# Patient Record
Sex: MALE | Race: WHITE | NOT HISPANIC OR LATINO | Employment: FULL TIME | URBAN - METROPOLITAN AREA
[De-identification: names, ages, dates, MRNs, and addresses within clinical notes are randomized per-mention and may not be internally consistent; named-entity substitution may affect disease eponyms.]

---

## 2017-04-17 ENCOUNTER — APPOINTMENT (OUTPATIENT)
Dept: GENERAL RADIOLOGY | Facility: CLINIC | Age: 57
End: 2017-04-17
Attending: EMERGENCY MEDICINE
Payer: COMMERCIAL

## 2017-04-17 ENCOUNTER — HOSPITAL ENCOUNTER (EMERGENCY)
Facility: CLINIC | Age: 57
Discharge: HOME | End: 2017-04-17
Attending: EMERGENCY MEDICINE | Admitting: EMERGENCY MEDICINE
Payer: COMMERCIAL

## 2017-04-17 VITALS
OXYGEN SATURATION: 97 % | DIASTOLIC BLOOD PRESSURE: 97 MMHG | SYSTOLIC BLOOD PRESSURE: 118 MMHG | RESPIRATION RATE: 20 BRPM | TEMPERATURE: 97.4 F | BODY MASS INDEX: 36.92 KG/M2 | HEART RATE: 67 BPM | WEIGHT: 250 LBS

## 2017-04-17 DIAGNOSIS — R07.89 RIGHT-SIDED CHEST WALL PAIN: ICD-10-CM

## 2017-04-17 LAB
ALBUMIN SERPL-MCNC: 3.5 G/DL (ref 3.4–5)
ALP SERPL-CCNC: 112 U/L (ref 40–150)
ALT SERPL W P-5'-P-CCNC: 45 U/L (ref 0–70)
ANION GAP SERPL CALCULATED.3IONS-SCNC: 8 MMOL/L (ref 3–14)
AST SERPL W P-5'-P-CCNC: 30 U/L (ref 0–45)
BASOPHILS # BLD AUTO: 0 10E9/L (ref 0–0.2)
BASOPHILS NFR BLD AUTO: 0.5 %
BILIRUB SERPL-MCNC: 0.4 MG/DL (ref 0.2–1.3)
BUN SERPL-MCNC: 19 MG/DL (ref 7–30)
CALCIUM SERPL-MCNC: 8.5 MG/DL (ref 8.5–10.1)
CHLORIDE SERPL-SCNC: 105 MMOL/L (ref 94–109)
CO2 SERPL-SCNC: 27 MMOL/L (ref 20–32)
CREAT SERPL-MCNC: 0.93 MG/DL (ref 0.66–1.25)
D DIMER PPP FEU-MCNC: 0.3 UG/ML FEU (ref 0–0.5)
DIFFERENTIAL METHOD BLD: NORMAL
EOSINOPHIL # BLD AUTO: 0.3 10E9/L (ref 0–0.7)
EOSINOPHIL NFR BLD AUTO: 4.8 %
ERYTHROCYTE [DISTWIDTH] IN BLOOD BY AUTOMATED COUNT: 13.7 % (ref 10–15)
GFR SERPL CREATININE-BSD FRML MDRD: 84 ML/MIN/1.7M2
GLUCOSE SERPL-MCNC: 114 MG/DL (ref 70–99)
HCT VFR BLD AUTO: 44.7 % (ref 40–53)
HGB BLD-MCNC: 15.1 G/DL (ref 13.3–17.7)
IMM GRANULOCYTES # BLD: 0 10E9/L (ref 0–0.4)
IMM GRANULOCYTES NFR BLD: 0.3 %
LYMPHOCYTES # BLD AUTO: 1.9 10E9/L (ref 0.8–5.3)
LYMPHOCYTES NFR BLD AUTO: 29.1 %
MCH RBC QN AUTO: 28.5 PG (ref 26.5–33)
MCHC RBC AUTO-ENTMCNC: 33.8 G/DL (ref 31.5–36.5)
MCV RBC AUTO: 84 FL (ref 78–100)
MONOCYTES # BLD AUTO: 0.5 10E9/L (ref 0–1.3)
MONOCYTES NFR BLD AUTO: 8 %
NEUTROPHILS # BLD AUTO: 3.7 10E9/L (ref 1.6–8.3)
NEUTROPHILS NFR BLD AUTO: 57.3 %
PLATELET # BLD AUTO: 199 10E9/L (ref 150–450)
POTASSIUM SERPL-SCNC: 4 MMOL/L (ref 3.4–5.3)
PROT SERPL-MCNC: 7.4 G/DL (ref 6.8–8.8)
RBC # BLD AUTO: 5.3 10E12/L (ref 4.4–5.9)
SODIUM SERPL-SCNC: 140 MMOL/L (ref 133–144)
TROPONIN I SERPL-MCNC: NORMAL UG/L (ref 0–0.04)
WBC # BLD AUTO: 6.4 10E9/L (ref 4–11)

## 2017-04-17 PROCEDURE — 80053 COMPREHEN METABOLIC PANEL: CPT | Performed by: EMERGENCY MEDICINE

## 2017-04-17 PROCEDURE — 71020 XR CHEST 2 VW: CPT

## 2017-04-17 PROCEDURE — 85379 FIBRIN DEGRADATION QUANT: CPT | Performed by: EMERGENCY MEDICINE

## 2017-04-17 PROCEDURE — 99284 EMERGENCY DEPT VISIT MOD MDM: CPT | Mod: 25 | Performed by: EMERGENCY MEDICINE

## 2017-04-17 PROCEDURE — 99285 EMERGENCY DEPT VISIT HI MDM: CPT | Mod: 25

## 2017-04-17 PROCEDURE — 85025 COMPLETE CBC W/AUTO DIFF WBC: CPT | Performed by: EMERGENCY MEDICINE

## 2017-04-17 PROCEDURE — 84484 ASSAY OF TROPONIN QUANT: CPT | Performed by: EMERGENCY MEDICINE

## 2017-04-17 PROCEDURE — 93010 ELECTROCARDIOGRAM REPORT: CPT | Performed by: EMERGENCY MEDICINE

## 2017-04-17 PROCEDURE — 93005 ELECTROCARDIOGRAM TRACING: CPT

## 2017-04-17 ASSESSMENT — ENCOUNTER SYMPTOMS
CHEST TIGHTNESS: 1
CARDIOVASCULAR NEGATIVE: 1
ALLERGIC/IMMUNOLOGIC NEGATIVE: 1
HEMATOLOGIC/LYMPHATIC NEGATIVE: 1
MUSCULOSKELETAL NEGATIVE: 1
GASTROINTESTINAL NEGATIVE: 1
ENDOCRINE NEGATIVE: 1
PSYCHIATRIC NEGATIVE: 1
NEUROLOGICAL NEGATIVE: 1
CONSTITUTIONAL NEGATIVE: 1

## 2017-04-17 NOTE — ED AVS SNAPSHOT
[facility] Emergency Department    [address] [address]    Phone:  [phone]    Fax:  [phone]                                       Prosper Gilmore   MRN: [id number]    Department:  [facility] Emergency Department   Date of Visit:  4/17/2017           After Visit Summary Signature Page     I have received my discharge instructions, and my questions have been answered. I have discussed any challenges I see with this plan with the nurse or doctor.    ..........................................................................................................................................  Patient/Patient Representative Signature      ..........................................................................................................................................  Patient Representative Print Name and Relationship to Patient    ..................................................               ................................................  Date                                            Time    ..........................................................................................................................................  Reviewed by Signature/Title    ...................................................              ..............................................  Date                                                            Time

## 2017-04-17 NOTE — ED AVS SNAPSHOT
[facility] Emergency Department    [address] [address]    Phone:  [phone]    Fax:  [phone]                                       Prosper Gilmore   MRN: [id number]    Department:  [facility] Emergency Department   Date of Visit:  4/17/2017           Patient Information     Date Of Birth          1960        Your diagnoses for this visit were:     Right-sided chest wall pain        You were seen by Teodoro Salmeron MD.      Follow-up Information     Follow up with [facility] Emergency Department.    Specialty:  EMERGENCY MEDICINE    Why:  As needed, If symptoms worsen    Contact information:    [address] [facility] [address] [facility] [address] [phone]    Additional information:    The medical center is located at   [address]. (between [address] and   [address] in [place], [place] miles north   of [place]).        Follow up with No Ref-Primary, Physician.    Why:  Your primary care provider if progressive or worsening symptoms. You may benefit from additional testing        Discharge Instructions          *CHEST PAIN, UNCERTAIN CAUSE    Based on your exam today, the exact cause of your chest pain is not certain. Your condition does not seem serious at this time, and your pain does not appear to be coming from your heart. However, sometimes the signs of a serious problem take more time to appear. Therefore, watch for the warning signs listed below.  HOME CARE:  1. Rest today and avoid strenuous activity.  2. Take any prescribed medicine as directed.  FOLLOW UP with your doctor in 1-3 days.   GET PROMPT MEDICAL ATTENTION if any of the following occur:    A change in the type of pain: if it feels different, becomes more severe, lasts longer, or begins to spread into your shoulder, arm, neck, jaw or back    Shortness of breath or increased pain with breathing    Weakness, dizziness, or fainting    Cough with blood or dark colored sputum (phlegm)    Fever  over 101  F (38.3  C)    Swelling, pain or redness in one leg    0313-0936 [facility], [address] [place] [address], [place] [address]. All rights reserved. This information is not intended as a substitute for professional medical care. Always follow your healthcare professional's instructions.      24 Hour Appointment Hotline       To make an appointment at any [facility], call [phone] ([phone]). If you don't have a family doctor or clinic, we will help you find one. [facility] (formerly [facility]) are conveniently located to serve the needs of you and your family.             Review of your medicines      Our records show that you are taking the medicines listed below. If these are incorrect, please call your family doctor or clinic.        Dose / Directions Last dose taken    FLUoxetine 40 MG capsule   Commonly known as:  PROzac   Dose:  40 mg        Take 40 mg by mouth daily.   Refills:  0        losartan-hydrochlorothiazide 50-12.5 MG per tablet   Commonly known as:  HYZAAR   Dose:  1 tablet        Take 1 tablet by mouth daily.   Refills:  0        SIMVASTATIN PO   Dose:  20 mg        Take 20 mg by mouth daily.   Refills:  0        ZOLOFT PO   Dose:  50 mg        Take 50 mg by mouth daily.   Refills:  0                Procedures and tests performed during your visit     CBC with platelets differential    Chest XR,  PA & LAT    Comprehensive metabolic panel    D dimer quantitative    EKG 12 lead    Troponin I      Orders Needing Specimen Collection     None      Pending Results     No orders found from 4/15/2017 to 4/18/2017.            Pending Culture Results     No orders found from 4/15/2017 to 4/18/2017.            Test Results From Your Hospital Stay        4/17/2017  8:48 AM      Component Results     Component Value Ref Range & Units Status    WBC 6.4 4.0 - 11.0 10e9/L Final    RBC Count 5.30 4.4 - 5.9 10e12/L Final    Hemoglobin 15.1 13.3 - 17.7 g/dL Final    Hematocrit 44.7 40.0 - 53.0 % Final    MCV  84 78 - 100 fl Final    MCH 28.5 26.5 - 33.0 pg Final    MCHC 33.8 31.5 - 36.5 g/dL Final    RDW 13.7 10.0 - 15.0 % Final    Platelet Count 199 150 - 450 10e9/L Final    Diff Method Automated Method  Final    % Neutrophils 57.3 % Final    % Lymphocytes 29.1 % Final    % Monocytes 8.0 % Final    % Eosinophils 4.8 % Final    % Basophils 0.5 % Final    % Immature Granulocytes 0.3 % Final    Absolute Neutrophil 3.7 1.6 - 8.3 10e9/L Final    Absolute Lymphocytes 1.9 0.8 - 5.3 10e9/L Final    Absolute Monocytes 0.5 0.0 - 1.3 10e9/L Final    Absolute Eosinophils 0.3 0.0 - 0.7 10e9/L Final    Absolute Basophils 0.0 0.0 - 0.2 10e9/L Final    Abs Immature Granulocytes 0.0 0 - 0.4 10e9/L Final         4/17/2017  9:13 AM      Component Results     Component Value Ref Range & Units Status    Sodium 140 133 - 144 mmol/L Final    Potassium 4.0 3.4 - 5.3 mmol/L Final    Chloride 105 94 - 109 mmol/L Final    Carbon Dioxide 27 20 - 32 mmol/L Final    Anion Gap 8 3 - 14 mmol/L Final    Glucose 114 (H) 70 - 99 mg/dL Final    Urea Nitrogen 19 7 - 30 mg/dL Final    Creatinine 0.93 0.66 - 1.25 mg/dL Final    GFR Estimate 84 >60 mL/min/1.7m2 Final    Non  GFR Calc    GFR Estimate If Black >90   GFR Calc   >60 mL/min/1.7m2 Final    Calcium 8.5 8.5 - 10.1 mg/dL Final    Bilirubin Total 0.4 0.2 - 1.3 mg/dL Final    Albumin 3.5 3.4 - 5.0 g/dL Final    Protein Total 7.4 6.8 - 8.8 g/dL Final    Alkaline Phosphatase 112 40 - 150 U/L Final    ALT 45 0 - 70 U/L Final    AST 30 0 - 45 U/L Final         4/17/2017  9:13 AM      Component Results     Component Value Ref Range & Units Status    Troponin I ES  0.000 - 0.045 ug/L Final    <0.015  The 99th percentile for upper reference range is 0.045 ug/L.  Troponin values in   the range of 0.045 - 0.120 ug/L may be associated with risks of adverse   clinical events.           4/17/2017  9:01 AM      Component Results     Component Value Ref Range & Units Status    D Dimer  "0.3 0.0 - 0.50 ug/ml FEU Final    This D-dimer assay is intended for use in conjuntion with a clinical pretest   probability assessment model to exclude pulmonary embolism (PE) and as an aid   in the diagnosis of deep venous thrombosis (DVT) in outpatients suspected of   PE   or DVT. The cut-off value is 0.5 g/mL FEU.           2017  9:56 AM      Narrative     XR CHEST 2 VW 2017 9:53 AM    HISTORY: Right anterior chest pain.    COMPARISON: None.    FINDINGS: No airspace consolidation, pleural effusion or pneumothorax.  Normal heart size. Left shoulder arthroplasty noted. Moderate  degenerative change at the right glenohumeral joint.        Impression     IMPRESSION: No acute cardiopulmonary abnormality.    ANDERS PALM MD                Thank you for choosing [facility]       Thank you for choosing [facility] for your care. Our goal is always to provide you with excellent care. Hearing back from our patients is one way we can continue to improve our services. Please take a few minutes to complete the written survey that you may receive in the mail after you visit with us. Thank you!        Enbase Information     Enbase lets you send messages to your doctor, view your test results, renew your prescriptions, schedule appointments and more. To sign up, go to [email or web address] . Click on \"Log in\" on the left side of the screen, which will take you to the Welcome page. Then click on \"Sign up Now\" on the right side of the page.     You will be asked to enter the access code listed below, as well as some personal information. Please follow the directions to create your username and password.     Your access code is: [email or web address]  Expires: 2017 10:42 AM     Your access code will  in 90 days. If you need help or a new code, please call your [facility] clinic or [phone].        Care EveryWhere ID     This is your Care EveryWhere ID. This could be used by other organizations to access your [facility] " medical records  [id number]        After Visit Summary       This is your record. Keep this with you and show to your community pharmacist(s) and doctor(s) at your next visit.

## 2017-04-17 NOTE — ED PROVIDER NOTES
History     Chief Complaint   Patient presents with     Chest Pain     HPI  Prosper Gilmore is a 56 year old male with a history of hypertension and hyperlipidemia who presents for evaluation for right anterior chest wall pain and discomfort.  Patient reports a history of pulmonary embolism after left hip surgery in 2010.  He was concerned because the pain was reminiscent of his history of PE 7 years ago and decided to come into the ED to be evaluated and assessed.  He lives in [facility] [place] but works in [place].  He is a nonsmoker.  He reports no cough, no chest tightness or pressure.  He does report some pleuritic nature to his discomfort with deep breaths.  No history of pneumothorax.  He is currently on Hyzaar, simvastatin Prozac and Zoloft.    Social history:Lives in [place], [place]. Here in ED alone by private car. Works in [place], [place]. Non-smoker.    Past medical history: History of pulmonary embolism after hip surgery in 2010.  History of hypertension, history of hyperlipidemia.    Medications:  No current facility-administered medications for this encounter.      Current Outpatient Prescriptions   Medication     meclizine (ANTIVERT) 25 MG tablet     FLUoxetine (PROZAC) 40 MG capsule     Sertraline HCl (ZOLOFT PO)     SIMVASTATIN PO     losartan-hydrochlorothiazide (HYZAAR) 50-12.5 MG per tablet     Allergies:   No Known Allergies  I have reviewed the Medications, Allergies, Past Medical and Surgical History, and Social History in the Epic system.    Review of Systems   Constitutional: Negative.    HENT: Negative.    Respiratory: Positive for chest tightness (right anterior chest discomfort.  ).    Cardiovascular: Negative.    Gastrointestinal: Negative.    Endocrine: Negative.    Genitourinary: Negative.    Musculoskeletal: Negative.    Skin: Negative.    Allergic/Immunologic: Negative.    Neurological: Negative.    Hematological: Negative.    Psychiatric/Behavioral: Negative.         Physical Exam   BP: (!) 151/97  Pulse: 67  Temp: 97.4  F (36.3  C)  Resp: 16  Weight: 113.4 kg (250 lb)  SpO2: 98 %  Physical Exam   Constitutional: He is oriented to person, place, and time. He appears well-developed and well-nourished. No distress.   HENT:   Head: Normocephalic and atraumatic.   Eyes: Conjunctivae and EOM are normal. Pupils are equal, round, and reactive to light. Right eye exhibits no discharge. Left eye exhibits no discharge. No scleral icterus.   Neck: Normal range of motion. Neck supple. No JVD present. No tracheal deviation present. No thyromegaly present.   Cardiovascular: Normal rate and regular rhythm.  Exam reveals no gallop and no friction rub.    No murmur heard.  Pulmonary/Chest: Effort normal and breath sounds normal. No stridor. No respiratory distress. He has no wheezes. He has no rales. He exhibits no tenderness.   Abdominal: Soft. Bowel sounds are normal. He exhibits no distension and no mass. There is no tenderness. There is no rebound and no guarding.   Lymphadenopathy:     He has no cervical adenopathy.   Neurological: He is alert and oriented to person, place, and time.   Skin: No rash noted. He is not diaphoretic. No erythema. No pallor.   Psychiatric: He has a normal mood and affect. His behavior is normal. Judgment and thought content normal.       ED Course     ED Course     Procedures             EKG Interpretation:      Interpreted by Teodoro Salmeron  Time reviewed:8:21AM  Symptoms at time of EKG: None   Rhythm: normal sinus   Rate: Normal  Axis: Normal  Ectopy: none  Conduction: normal  ST Segments/ T Waves: Non-specific ST-T wave changes, T wave inversion in III  Q Waves: nonspecific  Comparison to prior: Unchanged from 12/13/12    Clinical Impression: no acute changes          Critical Care time:  none                 ED medications: none    ED labs and imaging:  Results for orders placed or performed during the hospital encounter of 04/17/17 (from the  past 24 hour(s))   CBC with platelets differential   Result Value Ref Range    WBC 6.4 4.0 - 11.0 10e9/L    RBC Count 5.30 4.4 - 5.9 10e12/L    Hemoglobin 15.1 13.3 - 17.7 g/dL    Hematocrit 44.7 40.0 - 53.0 %    MCV 84 78 - 100 fl    MCH 28.5 26.5 - 33.0 pg    MCHC 33.8 31.5 - 36.5 g/dL    RDW 13.7 10.0 - 15.0 %    Platelet Count 199 150 - 450 10e9/L    Diff Method Automated Method     % Neutrophils 57.3 %    % Lymphocytes 29.1 %    % Monocytes 8.0 %    % Eosinophils 4.8 %    % Basophils 0.5 %    % Immature Granulocytes 0.3 %    Absolute Neutrophil 3.7 1.6 - 8.3 10e9/L    Absolute Lymphocytes 1.9 0.8 - 5.3 10e9/L    Absolute Monocytes 0.5 0.0 - 1.3 10e9/L    Absolute Eosinophils 0.3 0.0 - 0.7 10e9/L    Absolute Basophils 0.0 0.0 - 0.2 10e9/L    Abs Immature Granulocytes 0.0 0 - 0.4 10e9/L   Comprehensive metabolic panel   Result Value Ref Range    Sodium 140 133 - 144 mmol/L    Potassium 4.0 3.4 - 5.3 mmol/L    Chloride 105 94 - 109 mmol/L    Carbon Dioxide 27 20 - 32 mmol/L    Anion Gap 8 3 - 14 mmol/L    Glucose 114 (H) 70 - 99 mg/dL    Urea Nitrogen 19 7 - 30 mg/dL    Creatinine 0.93 0.66 - 1.25 mg/dL    GFR Estimate 84 >60 mL/min/1.7m2    GFR Estimate If Black >90   GFR Calc   >60 mL/min/1.7m2    Calcium 8.5 8.5 - 10.1 mg/dL    Bilirubin Total 0.4 0.2 - 1.3 mg/dL    Albumin 3.5 3.4 - 5.0 g/dL    Protein Total 7.4 6.8 - 8.8 g/dL    Alkaline Phosphatase 112 40 - 150 U/L    ALT 45 0 - 70 U/L    AST 30 0 - 45 U/L   Troponin I   Result Value Ref Range    Troponin I ES  0.000 - 0.045 ug/L     <0.015  The 99th percentile for upper reference range is 0.045 ug/L.  Troponin values in   the range of 0.045 - 0.120 ug/L may be associated with risks of adverse   clinical events.     D dimer quantitative   Result Value Ref Range    D Dimer 0.3 0.0 - 0.50 ug/ml FEU   Chest XR,  PA & LAT    Narrative    XR CHEST 2 VW 4/17/2017 9:53 AM    HISTORY: Right anterior chest pain.    COMPARISON: None.    FINDINGS: No  airspace consolidation, pleural effusion or pneumothorax.  Normal heart size. Left shoulder arthroplasty noted. Moderate  degenerative change at the right glenohumeral joint.      Impression    IMPRESSION: No acute cardiopulmonary abnormality.    ANDERS PALM MD       ED vitals:  Vitals:    04/17/17 0805 04/17/17 0808 04/17/17 0830   BP:  (!) 151/97    Pulse: 67     Resp: 16  20   Temp: 97.4  F (36.3  C)     TempSrc: Oral     SpO2: 98% 97%    Weight:  113.4 kg (250 lb)      Assessments & Plan (with Medical Decision Making)   Clinical impression: Pleasant 56-year-old male who presented for right anterior chest pain and discomfort that is positional after he awoke from sleep this morning.  He was concerned that his chest discomfort is reminiscent of his history of pulmonary embolism which she had 7 years ago after left hip surgery.  Pain was pleuritic in nature at onset but he is currently asymptomatic.  No fever, no cough, no shortness of breath no rash around the chest.  No back pain or flank pain.  He reports no trauma to the chest wall and no excessive exercise or anything out of the ordinary from his routine.  On my exam he is in no acute distress he has no complaints is a normal cardiac exam and normal lung exam with normal chest excursion.      ED course and Plan:  EKG on ED arrival shows T-wave inversion in lead 3.  Normal sinus rhythm no acute ischemia is appreciated.  An EKG is unchanged from comparison dated 12/13/2013.  He has normal labs today including a negative d-dimer normal troponin.  A chest x-ray was obtained to exclude any acute cardiopulmonary process.  X-ray of the chest was negative for acute cardiopulmonary process.  See radiologist interpretation in detailed report above.  He remained asymptomatic during my course of care and evaluation emergency Department.    He is  is discharged home with chest pain of unclear cause.  Pain is likely musculoskeletal in nature given positional nature,  unremarkable exam, normal d-dimer and troponin.  We did discuss that if he has progressive or worsening symptoms he will benefit from follow-up with his primary care provider in 3 days for additional testing and imaging or provocation studies including stress testing.      Disclaimer: This note consists of symbols derived from keyboarding, dictation and/or voice recognition software. As a result, there may be errors in the script that have gone undetected. Please consider this when interpreting information found in this chart.  I have reviewed the nursing notes.    I have reviewed the findings, diagnosis, plan and need for follow up with the patient.    New Prescriptions    No medications on file       Final diagnoses:   Right-sided chest wall pain       4/17/2017   [facility] EMERGENCY DEPARTMENT     Teodoro Salmeron MD  04/17/17 8611

## 2017-04-17 NOTE — ED NOTES
Pt here with right sided chest pain, worse with activity. Pt reports HX of PE after hip surgery in the past

## 2024-01-14 ENCOUNTER — TRANSFERRED RECORDS (OUTPATIENT)
Dept: HEALTH INFORMATION MANAGEMENT | Facility: CLINIC | Age: 64
End: 2024-01-14

## 2024-02-15 ENCOUNTER — TRANSFERRED RECORDS (OUTPATIENT)
Dept: HEALTH INFORMATION MANAGEMENT | Facility: CLINIC | Age: 64
End: 2024-02-15

## 2024-03-15 ENCOUNTER — TRANSFERRED RECORDS (OUTPATIENT)
Dept: HEALTH INFORMATION MANAGEMENT | Facility: CLINIC | Age: 64
End: 2024-03-15

## 2024-03-19 ENCOUNTER — TRANSFERRED RECORDS (OUTPATIENT)
Dept: HEALTH INFORMATION MANAGEMENT | Facility: CLINIC | Age: 64
End: 2024-03-19

## 2024-03-27 ENCOUNTER — TELEPHONE (OUTPATIENT)
Dept: ORTHOPEDICS | Facility: CLINIC | Age: 64
End: 2024-03-27

## 2024-03-27 NOTE — TELEPHONE ENCOUNTER
Sent [employer] (1st Attempt) for the patient to call back and schedule the following:    Appointment type: New Shoulder  Provider: Dr. Sewell  Return date: 4/15/2024  Specialty phone number: [phone]  Additional appointment(s) needed:   Additonal Notes:     Attempted to reach the patient and schedule an appointment with Dr. Sewell on 4/15/2024, in a 20 min spot per Ortho Surg Staff.    Patient phone not accepting calls, no voicemail. Sent a [employer] message.    Ignacia GARCIA/Complex Procedure    [facility]   Neurology, NeuroSurgery, NeuroPsychology, Pain Management and Cardiology Specialties  Medical/Surgical Adult Specialties

## 2024-04-15 ENCOUNTER — OFFICE VISIT (OUTPATIENT)
Dept: ORTHOPEDICS | Facility: CLINIC | Age: 64
End: 2024-04-15
Payer: COMMERCIAL

## 2024-04-15 ENCOUNTER — TELEPHONE (OUTPATIENT)
Dept: ORTHOPEDICS | Facility: CLINIC | Age: 64
End: 2024-04-15

## 2024-04-15 VITALS — WEIGHT: 262 LBS | BODY MASS INDEX: 37.51 KG/M2 | HEIGHT: 70 IN

## 2024-04-15 DIAGNOSIS — Z96.612 INFECTION ASSOCIATED WITH PROSTHESIS OF LEFT SHOULDER JOINT (H): Primary | ICD-10-CM

## 2024-04-15 DIAGNOSIS — T84.59XA INFECTION ASSOCIATED WITH PROSTHESIS OF LEFT SHOULDER JOINT (H): Primary | ICD-10-CM

## 2024-04-15 PROCEDURE — 99204 OFFICE O/P NEW MOD 45 MIN: CPT | Performed by: ORTHOPAEDIC SURGERY

## 2024-04-15 NOTE — LETTER
4/15/2024         RE: Prosper Gilmore  [address] [place] [address]        Dear Colleague,    Thank you for referring your patient, Prosper Gilmore, to the [facility]. Please see a copy of my visit note below.    CHIEF CONCERN:  Left shoulder resection of anatomic shoulder arthroplasty     HISTORY OF PRESENT ILLNESS:  Mr. Gilmore is a 63 year old RHD man who was referred by Dr. HERMAN Dickinson for the patient's complex shoulder condition. He had an anatomic TSA done approximately 22 years ago and it performed well for quite some time. He describes that the original TSA was done for end stage arthrosis with possible collapse. 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 He worked as a referee within pro or semi-pro leagues. He ultimately developed loosening of his all poly cemented glenoid component and underwent resection of the implants with Dr. Dickinson and placement of a spacer on 3/6/24. Cultures grew C acnes and he has received antibiotics (IV Ceftriaxone) with a stop date planned for 5/2/24 (his ID physician is Dr. Michael in [facility]).    Past Medical History:   1. Deep vein blood clot of left lower extremity (HC) after left SANJU and immoblization   2. GERD (gastroesophageal reflux disease)   3. Hydrocele 2023   4. Hyperlipidemia   5. Hypertension   6. Obesity   7. Pulmonary embolism (HC) -left SANJU and immoblization provoked clot   8. Sleep apnea     Current Outpatient Medications   Medication Sig Dispense Refill     FLUoxetine (PROZAC) 40 MG capsule Take 40 mg by mouth daily.       losartan-hydrochlorothiazide (HYZAAR) 50-12.5 MG per tablet Take 1 tablet by mouth daily.       Sertraline HCl (ZOLOFT PO) Take 50 mg by mouth daily.       SIMVASTATIN PO Take 20 mg by mouth daily.          No Known Allergies    SOCIAL HISTORY:    Social History     Socioeconomic History     Marital status:      Spouse name: Not on file     Number of children: Not on  file     Years of education: Not on file     Highest education level: Not on file   Occupational History     Not on file   Tobacco Use     Smoking status: Never     Smokeless tobacco: Never   Substance and Sexual Activity     Alcohol use: No     Drug use: No     Sexual activity: Not on file   Other Topics Concern     Not on file   Social History Narrative     Not on file     Social Determinants of Health     Financial Resource Strain: Not At Risk (9/3/2023)    Received from [employer]    Financial Resource Strain      Is it hard for you to pay for the very basics like food, housing, medical care or heating?: No   Food Insecurity: Not At Risk (9/3/2023)    [facility]    Food Insecurity      Does your food run out before you have the money to buy more?: No   Transportation Needs: Not At Risk (9/3/2023)    Received from [employer]    Transportation Needs      Does a lack of transportation keep you from your medical appointments or from getting your medications?: No   Physical Activity: Not on file   Stress: Not on file   Social Connections: Unknown (3/15/2024)    [facility] [facility] [facility]    Social Connections      Frequency of Communication with Friends and Family: Not on file   Interpersonal Safety: Not on file   Housing Stability: Not on file       FAMILY HISTORY: Reviewed in EMR      REVIEW OF SYSTEMS: Positive for that noted in past medical history and history of present illness and otherwise reviewed in EMR     PHYSICAL EXAM:    Adult male in no acute distress. Articulates and communicates with normal affect.  Respirations even and unlabored  Focused upper extremity exam: Skin intact. No erythema. Sensation intact all dermatomes into the hand to light touch. EPL, FPL, and Intrinsics intact. Right shoulder active motion is FE to 145, ER at side to 35, and IR to  back pocket. Left shoulder motion limited by resection and spacer/recent surgery. Incisions healing well.      IMAGING:  Left shoulder CT done after his resection arthroplasty reviewed and demonstrates a large area of glenoid bone loss. 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 We discussed the steps toward revision arthroplasty and that this would (if achieving any sort of total shoulder arthroplasty) require a custom glenoid implant. In my hands this would be a VRS system patient specific implant. His postop CT has been received by the  and we are awaiting a plan from the engineers. I will review and ideally approve such a plan. We discussed that cultures would be obtained at that time. We have worked with our own ID team to determine a path forward and would not repeat cultures in another 2 stage approach for a number of reasons but primarily owing to organism specifics and patient quality of life. We discussed that if cultures remain positive at the revision he could require another course of antibiotics and a chronic infection is a possibility.   I told the patient there are no guarantees with surgery, that a person could be no better or even worse if they became stiff, further infected, need further surgery, have injury to the nerves and arteries that power the hand and arm, have a reaction to the anesthetic.  We will await a timeline from the implant engineers and inform patient of a date pending that information (he is aware implant manufacturing can take 8-10 weeks).     Kesha Sewell MD    Again, thank you for allowing me to participate in the care of your patient.        Sincerely,        Kesha Sewell MD

## 2024-04-15 NOTE — TELEPHONE ENCOUNTER
Waiting on [employer] to review VRS and then can give Pt a surgery date.    Procedure: Left reverse total shoulder arthroplasty  Facility: [facility]  Length: ? minutes  Anesthesia: Choice, Interscalene Block  Post-op appointments needed: 2 weeks with surgeon or PA, 6 weeks with surgeon only.  Surgery packet/instructions given to patient?  Yes     Martínez Emmanuel RN

## 2024-04-15 NOTE — NURSING NOTE
"Reason For Visit:   Chief Complaint   Patient presents with    Consult For     Left shoulder pain - temporary replacement placed and he would like a permanent one.        PCP: No Ref-Primary, Physician  Ref: Dr. Dickinson    ?  No  Occupation .  Currently working? No.  Work status?  Full time.  Date of injury: Worse over time  Type of injury: Worse over time.  Date of surgery: s/p left total shoulder arthroplasty explantation, placement of antibiotic spacer, and irrigation and excisional debridement DOS: 3/6/24; s/p left TSA DOS: 22 years ago  Smoker: No  Request smoking cessation information: No    Right hand dominant    SANE score  Affected shoulder: Left  Right shoulder SANE: 50   Left shoulder SANE: 0    Ht 1.778 m (5' 10\")   Wt 118.8 kg (262 lb)   BMI 37.59 kg/m      Candi Hodgson ATC  "

## 2024-04-15 NOTE — NURSING NOTE
"Pre-Operative Teaching Flowsheet     Person(s) involved in teaching: Patient     Motivation Level:  Receptive (willing/able to accept information) and asks appropriate questions where applicable: Yes  Any cultural factors/Evangelical beliefs that may influence understanding or compliance? No     Patient demonstrates understanding of the following:  Pre-operative planning, including the necessary appointments and preparation needed prior to surgery: Yes  Which situations necessitate calling provider and whom to contact: Yes  Pain management techniques pre and post op: Yes  Stoplight tool introduced, questions answered, patient expressed understanding: Yes  How, and when, to access community resources: Yes  Discussed appropriate and safe discharge to home: Yes  Patient has a designated  for surgery and \"\" to stay with them after: Yes    Additional Teaching Concerns Addressed:  Post-operative living arrangements and necessary adaptations to living environment.  Instructional Materials Used/Given: Yes, pre-op packet given to patient with additional system forms added as needed depending on type of surgery. Pre-op soap given (if in clinic).     Time spent with patient: 20 minutes.    Martínez Emmanuel RNCC    "

## 2024-04-23 NOTE — TELEPHONE ENCOUNTER
Dr. Sewell discussed with [employer] and they advised that implant would be available mid-June. Will see if we can get OR time around then.    Martínez Emmanuel RNCC

## 2024-04-27 ENCOUNTER — HEALTH MAINTENANCE LETTER (OUTPATIENT)
Age: 64
End: 2024-04-27

## 2024-04-28 NOTE — PROGRESS NOTES
CHIEF CONCERN:  Left shoulder resection of anatomic shoulder arthroplasty     HISTORY OF PRESENT ILLNESS:  Mr. Gilmore is a 63 year old RHD man who was referred by Dr. HERMAN Dickinson for the patient's complex shoulder condition. He had an anatomic TSA done approximately 22 years ago and it performed well for quite some time. He describes that the original TSA was done for end stage arthrosis with possible collapse. 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 He worked as a referee within pro or semi-pro leagues. He ultimately developed loosening of his all poly cemented glenoid component and underwent resection of the implants with Dr. Dickinson and placement of a spacer on 3/6/24. Cultures grew C acnes and he has received antibiotics (IV Ceftriaxone) with a stop date planned for 5/2/24 (his ID physician is Dr. Michael in [facility]).    Past Medical History:   1. Deep vein blood clot of left lower extremity (HC) after left SANJU and immoblization   2. GERD (gastroesophageal reflux disease)   3. Hydrocele 2023   4. Hyperlipidemia   5. Hypertension   6. Obesity   7. Pulmonary embolism (HC) -left SANJU and immoblization provoked clot   8. Sleep apnea     Current Outpatient Medications   Medication Sig Dispense Refill    FLUoxetine (PROZAC) 40 MG capsule Take 40 mg by mouth daily.      losartan-hydrochlorothiazide (HYZAAR) 50-12.5 MG per tablet Take 1 tablet by mouth daily.      Sertraline HCl (ZOLOFT PO) Take 50 mg by mouth daily.      SIMVASTATIN PO Take 20 mg by mouth daily.          No Known Allergies    SOCIAL HISTORY:    Social History     Socioeconomic History    Marital status:      Spouse name: Not on file    Number of children: Not on file    Years of education: Not on file    Highest education level: Not on file   Occupational History    Not on file   Tobacco Use    Smoking status: Never    Smokeless tobacco: Never   Substance and Sexual Activity    Alcohol use: No    Drug use: No     Sexual activity: Not on file   Other Topics Concern    Not on file   Social History Narrative    Not on file     Social Determinants of Health     Financial Resource Strain: Not At Risk (9/3/2023)    Received from [employer] [facility]    Financial Resource Strain     Is it hard for you to pay for the very basics like food, housing, medical care or heating?: No   Food Insecurity: Not At Risk (9/3/2023)    Received from [facility] [facility]    Food Insecurity     Does your food run out before you have the money to buy more?: No   Transportation Needs: Not At Risk (9/3/2023)    Received from [facility] [facility]    Transportation Needs     Does a lack of transportation keep you from your medical appointments or from getting your medications?: No   Physical Activity: Not on file   Stress: Not on file   Social Connections: Unknown (3/15/2024)    Received from [employer] [facility], [facility]    Social Connections     Frequency of Communication with Friends and Family: Not on file   Interpersonal Safety: Not on file   Housing Stability: Not on file       FAMILY HISTORY: Reviewed in EMR      REVIEW OF SYSTEMS: Positive for that noted in past medical history and history of present illness and otherwise reviewed in EMR     PHYSICAL EXAM:    Adult male in no acute distress. Articulates and communicates with normal affect.  Respirations even and unlabored  Focused upper extremity exam: Skin intact. No erythema. Sensation intact all dermatomes into the hand to light touch. EPL, FPL, and Intrinsics intact. Right shoulder active motion is FE to 145, ER at side to 35, and IR to back pocket. Left shoulder motion limited by resection and spacer/recent surgery. Incisions healing well.      IMAGING:  Left shoulder CT done after his resection arthroplasty reviewed and demonstrates a large area of glenoid bone loss. 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 We discussed the steps toward revision arthroplasty and that this would (if achieving any sort of total shoulder arthroplasty) require a custom glenoid implant. In my hands this would be a VRS system patient specific implant. His postop CT has been received by the  and we are awaiting a plan from the engineers. I will review and ideally approve such a plan. We discussed that cultures would be obtained at that time. We have worked with our own ID team to determine a path forward and would not repeat cultures in another 2 stage approach for a number of reasons but primarily owing to organism specifics and patient quality of life. We discussed that if cultures remain positive at the revision he could require another course of antibiotics and a chronic infection is a possibility.   I told the patient there are no guarantees with surgery, that a person could be no better or even worse if they became stiff, further infected, need further surgery, have injury to the nerves and arteries that power the hand and arm, have a reaction to the anesthetic.  We will await a timeline from the implant engineers and inform patient of a date pending that information (he is aware implant manufacturing can take 8-10 weeks).     Kesha Sewell MD

## 2024-04-29 DIAGNOSIS — Z96.612 INFECTION ASSOCIATED WITH PROSTHESIS OF LEFT SHOULDER JOINT (H): Primary | ICD-10-CM

## 2024-04-29 DIAGNOSIS — T84.59XA INFECTION ASSOCIATED WITH PROSTHESIS OF LEFT SHOULDER JOINT (H): Primary | ICD-10-CM

## 2024-05-01 NOTE — TELEPHONE ENCOUNTER
Date Scheduled: 7-17-24  Facility: Surgery Locations: [facility]  Surgeon: Dr. Sewell   Post-op appointment scheduled:    scheduled?: No  Surgery packet/instructions confirmed received?  Yes  Pre op physical/PAC appointment: [facility]  Special Considerations:       Would like to get moved up if possible.      Aby Minor  Surgery Scheduling Coordinator  Ph: [phone]

## 2024-06-27 NOTE — TELEPHONE ENCOUNTER
Received voicemail from patient that he would like a call back confirming that the implant is available for his surgery on 7-17. He had his pre op physical on 6-20-24, so he just wants to confirm that there are no glitches with his surgery. Routing to Dr. Sewell's team to see if they have any updates.    Aby Minor  Surgery Scheduling Coordinator  Ph: [phone]

## 2024-07-10 RX ORDER — OMEPRAZOLE 40 MG/1
40 CAPSULE, DELAYED RELEASE ORAL DAILY
COMMUNITY

## 2024-07-10 RX ORDER — BUPROPION HYDROCHLORIDE 300 MG/1
300 TABLET ORAL EVERY MORNING
COMMUNITY

## 2024-07-10 RX ORDER — LORATADINE 10 MG/1
10 TABLET ORAL DAILY
Status: ON HOLD | COMMUNITY
End: 2024-07-17

## 2024-07-10 RX ORDER — MULTIPLE VITAMINS W/ MINERALS TAB 9MG-400MCG
1 TAB ORAL DAILY
COMMUNITY

## 2024-07-10 RX ORDER — ASPIRIN 81 MG/1
81 TABLET ORAL DAILY
COMMUNITY

## 2024-07-10 RX ORDER — NALTREXONE HYDROCHLORIDE 50 MG/1
50 TABLET, FILM COATED ORAL DAILY
COMMUNITY

## 2024-07-10 RX ORDER — AZELASTINE 1 MG/ML
1 SPRAY, METERED NASAL DAILY PRN
COMMUNITY

## 2024-07-10 RX ORDER — TAMSULOSIN HYDROCHLORIDE 0.4 MG/1
0.4 CAPSULE ORAL DAILY
COMMUNITY

## 2024-07-10 NOTE — PHARMACY-ADMISSION MEDICATION HISTORY
Pharmacy Intern Pre-operative Admission Medication History    Admission medication history was completed on July 10, 2024 in anticipation for upcoming surgical admission currently scheduled for 7/17/24. The information provided in this note is only as accurate as the sources available at the time of the update.  Pre-operative nursing staff should still review this list with patient for any changes or updates.     Information Source(s): Patient via phone    Pertinent Information:   Patient stated that his spouse helps maintain his medications. This med history was completed without her present and patient was unsure if there were any more medications to add. It is notable that Care Everywhere has active orders for acetaminophen 1000 mg q8h PRN and hydroxyzine 25 mg q8h PRN.    Patient stopped taking CoQ-10 on 7/10/24 with no plans to restart.    Changes made to PTA medication list:  Added:   Azelastine spray  Bupropion 300 mg  Naltrexone 50 mg  Tamsulosin 0.4 mg cap  Vitamin D3 PO  Aspirin 81 mg  Multivitamin  Fish oil PO  Loratadine 10 mg  Omeprazole 40 mg  Deleted:   Fluoxetine 40 mg cap  Changed:   Sertraline 50 mg -> 200 mg    Allergies reviewed with patient and updates made in EHR: yes    Medication History Completed By: Billy Palomo 7/10/2024 2:57 PM    PTA Med List   Medication Sig Last Dose    aspirin 81 MG EC tablet Take 81 mg by mouth daily     azelastine (ASTELIN) 0.1 % nasal spray Spray 1 spray into both nostrils daily as needed for rhinitis     buPROPion (WELLBUTRIN XL) 300 MG 24 hr tablet Take 300 mg by mouth every morning     Cholecalciferol (VITAMIN D3 PO) Take by mouth daily     loratadine (CLARITIN) 10 MG tablet Take 10 mg by mouth daily     losartan-hydrochlorothiazide (HYZAAR) 50-12.5 MG per tablet Take 1 tablet by mouth daily.     multivitamin w/minerals (THERA-VIT-M) tablet Take 1 tablet by mouth daily     naltrexone (DEPADE/REVIA) 50 MG tablet Take 50 mg by mouth daily     Omega-3 Fatty  Acids (FISH OIL PO)      omeprazole (PRILOSEC) 40 MG DR capsule Take 40 mg by mouth daily     sertraline (ZOLOFT) 100 MG tablet Take 200 mg by mouth daily     simvastatin (ZOCOR) 20 MG tablet Take 20 mg by mouth daily     tamsulosin (FLOMAX) 0.4 MG capsule Take 0.4 mg by mouth daily

## 2024-07-15 RX ORDER — CETIRIZINE HYDROCHLORIDE 10 MG/1
10 TABLET ORAL DAILY
COMMUNITY

## 2024-07-16 ENCOUNTER — ANESTHESIA EVENT (OUTPATIENT)
Dept: SURGERY | Facility: CLINIC | Age: 64
DRG: 483 | End: 2024-07-16
Payer: COMMERCIAL

## 2024-07-17 ENCOUNTER — APPOINTMENT (OUTPATIENT)
Dept: GENERAL RADIOLOGY | Facility: CLINIC | Age: 64
DRG: 483 | End: 2024-07-17
Attending: ORTHOPAEDIC SURGERY
Payer: COMMERCIAL

## 2024-07-17 ENCOUNTER — HOSPITAL ENCOUNTER (INPATIENT)
Facility: CLINIC | Age: 64
LOS: 1 days | Discharge: HOME | DRG: 483 | End: 2024-07-18
Attending: ORTHOPAEDIC SURGERY | Admitting: ORTHOPAEDIC SURGERY
Payer: COMMERCIAL

## 2024-07-17 ENCOUNTER — ANESTHESIA (OUTPATIENT)
Dept: SURGERY | Facility: CLINIC | Age: 64
DRG: 483 | End: 2024-07-17
Payer: COMMERCIAL

## 2024-07-17 DIAGNOSIS — Z96.612 HISTORY OF LEFT SHOULDER REPLACEMENT: Primary | ICD-10-CM

## 2024-07-17 PROBLEM — K42.9 UMBILICAL HERNIA WITHOUT OBSTRUCTION AND WITHOUT GANGRENE: Status: ACTIVE | Noted: 2024-07-17

## 2024-07-17 PROBLEM — R73.03 PREDIABETES: Status: ACTIVE | Noted: 2023-09-07

## 2024-07-17 PROBLEM — M48.07 SPINAL STENOSIS, LUMBOSACRAL REGION: Status: ACTIVE | Noted: 2024-07-17

## 2024-07-17 PROBLEM — G47.33 OBSTRUCTIVE SLEEP APNEA (ADULT) (PEDIATRIC): Status: ACTIVE | Noted: 2024-07-17

## 2024-07-17 PROBLEM — G47.00 INSOMNIA, UNSPECIFIED TYPE: Status: ACTIVE | Noted: 2022-11-08

## 2024-07-17 PROBLEM — N40.0 BPH (BENIGN PROSTATIC HYPERPLASIA): Status: ACTIVE | Noted: 2024-03-05

## 2024-07-17 PROBLEM — T84.50XA INFECTION AND INFLAMMATORY REACTION DUE TO INTERNAL JOINT PROSTHESIS (H): Status: ACTIVE | Noted: 2024-03-15

## 2024-07-17 PROBLEM — K40.90 NON-RECURRENT UNILATERAL INGUINAL HERNIA WITHOUT OBSTRUCTION OR GANGRENE: Status: ACTIVE | Noted: 2024-07-17

## 2024-07-17 PROBLEM — F32.A DEPRESSION: Status: ACTIVE | Noted: 2024-03-05

## 2024-07-17 PROBLEM — G25.81 RESTLESS LEG: Status: ACTIVE | Noted: 2021-06-08

## 2024-07-17 LAB — GLUCOSE BLDC GLUCOMTR-MCNC: 114 MG/DL (ref 70–99)

## 2024-07-17 PROCEDURE — 710N000010 HC RECOVERY PHASE 1, LEVEL 2, PER MIN: Performed by: ORTHOPAEDIC SURGERY

## 2024-07-17 PROCEDURE — 250N000011 HC RX IP 250 OP 636: Performed by: ANESTHESIOLOGY

## 2024-07-17 PROCEDURE — 370N000017 HC ANESTHESIA TECHNICAL FEE, PER MIN: Performed by: ORTHOPAEDIC SURGERY

## 2024-07-17 PROCEDURE — 250N000011 HC RX IP 250 OP 636: Performed by: NURSE ANESTHETIST, CERTIFIED REGISTERED

## 2024-07-17 PROCEDURE — 271N000001 HC OR GENERAL SUPPLY NON-STERILE: Performed by: ORTHOPAEDIC SURGERY

## 2024-07-17 PROCEDURE — 0RPK08Z REMOVAL OF SPACER FROM LEFT SHOULDER JOINT, OPEN APPROACH: ICD-10-PCS | Performed by: ORTHOPAEDIC SURGERY

## 2024-07-17 PROCEDURE — 999N000065 XR SHOULDER LEFT PORT G/E 2 VIEWS: Mod: LT

## 2024-07-17 PROCEDURE — 0RRK00Z REPLACEMENT OF LEFT SHOULDER JOINT WITH REVERSE BALL AND SOCKET SYNTHETIC SUBSTITUTE, OPEN APPROACH: ICD-10-PCS | Performed by: ORTHOPAEDIC SURGERY

## 2024-07-17 PROCEDURE — 23472 RECONSTRUCT SHOULDER JOINT: CPT | Performed by: ANESTHESIOLOGY

## 2024-07-17 PROCEDURE — 250N000011 HC RX IP 250 OP 636: Performed by: ORTHOPAEDIC SURGERY

## 2024-07-17 PROCEDURE — C1776 JOINT DEVICE (IMPLANTABLE): HCPCS | Performed by: ORTHOPAEDIC SURGERY

## 2024-07-17 PROCEDURE — 250N000025 HC SEVOFLURANE, PER MIN: Performed by: ORTHOPAEDIC SURGERY

## 2024-07-17 PROCEDURE — P9045 ALBUMIN (HUMAN), 5%, 250 ML: HCPCS | Performed by: NURSE ANESTHETIST, CERTIFIED REGISTERED

## 2024-07-17 PROCEDURE — 64415 NJX AA&/STRD BRCH PLXS IMG: CPT | Mod: 59 | Performed by: ANESTHESIOLOGY

## 2024-07-17 PROCEDURE — 11982 REMOVE DRUG IMPLANT DEVICE: CPT | Mod: LT | Performed by: ORTHOPAEDIC SURGERY

## 2024-07-17 PROCEDURE — 999N000141 HC STATISTIC PRE-PROCEDURE NURSING ASSESSMENT: Performed by: ORTHOPAEDIC SURGERY

## 2024-07-17 PROCEDURE — 360N000078 HC SURGERY LEVEL 5, PER MIN: Performed by: ORTHOPAEDIC SURGERY

## 2024-07-17 PROCEDURE — 258N000001 HC RX 258: Performed by: ORTHOPAEDIC SURGERY

## 2024-07-17 PROCEDURE — 120N000002 HC R&B MED SURG/OB UMMC

## 2024-07-17 PROCEDURE — 250N000009 HC RX 250: Performed by: NURSE ANESTHETIST, CERTIFIED REGISTERED

## 2024-07-17 PROCEDURE — 87075 CULTR BACTERIA EXCEPT BLOOD: CPT | Performed by: ORTHOPAEDIC SURGERY

## 2024-07-17 PROCEDURE — 87070 CULTURE OTHR SPECIMN AEROBIC: CPT | Performed by: ORTHOPAEDIC SURGERY

## 2024-07-17 PROCEDURE — C9290 INJ, BUPIVACAINE LIPOSOME: HCPCS | Performed by: ANESTHESIOLOGY

## 2024-07-17 PROCEDURE — 258N000003 HC RX IP 258 OP 636: Performed by: NURSE ANESTHETIST, CERTIFIED REGISTERED

## 2024-07-17 PROCEDURE — 23472 RECONSTRUCT SHOULDER JOINT: CPT | Mod: 22 | Performed by: ORTHOPAEDIC SURGERY

## 2024-07-17 PROCEDURE — 250N000009 HC RX 250: Performed by: ORTHOPAEDIC SURGERY

## 2024-07-17 PROCEDURE — 250N000013 HC RX MED GY IP 250 OP 250 PS 637: Performed by: INTERNAL MEDICINE

## 2024-07-17 PROCEDURE — C1713 ANCHOR/SCREW BN/BN,TIS/BN: HCPCS | Performed by: ORTHOPAEDIC SURGERY

## 2024-07-17 PROCEDURE — 23472 RECONSTRUCT SHOULDER JOINT: CPT | Performed by: NURSE ANESTHETIST, CERTIFIED REGISTERED

## 2024-07-17 PROCEDURE — 258N000003 HC RX IP 258 OP 636: Performed by: ORTHOPAEDIC SURGERY

## 2024-07-17 PROCEDURE — 99254 IP/OBS CNSLTJ NEW/EST MOD 60: CPT | Performed by: INTERNAL MEDICINE

## 2024-07-17 PROCEDURE — 272N000001 HC OR GENERAL SUPPLY STERILE: Performed by: ORTHOPAEDIC SURGERY

## 2024-07-17 PROCEDURE — 250N000013 HC RX MED GY IP 250 OP 250 PS 637: Performed by: ORTHOPAEDIC SURGERY

## 2024-07-17 PROCEDURE — 250N000012 HC RX MED GY IP 250 OP 636 PS 637: Performed by: ANESTHESIOLOGY

## 2024-07-17 DEVICE — IMPLANTABLE DEVICE
Type: IMPLANTABLE DEVICE | Site: SHOULDER | Status: FUNCTIONAL
Brand: COMPREHENSIVE® REVERSE SHOULDER

## 2024-07-17 DEVICE — IMPLANTABLE DEVICE
Type: IMPLANTABLE DEVICE | Site: SHOULDER | Status: FUNCTIONAL
Brand: COMPREHENSIVE® SHOULDER SYSTEM

## 2024-07-17 DEVICE — IMPLANTABLE DEVICE
Type: IMPLANTABLE DEVICE | Site: SHOULDER | Status: FUNCTIONAL
Brand: COMPREHENSIVE® PROLONG®

## 2024-07-17 DEVICE — IMPLANTABLE DEVICE
Type: IMPLANTABLE DEVICE | Site: SHOULDER | Status: FUNCTIONAL
Brand: COMPREHENSIVE® VRS GLENOID

## 2024-07-17 DEVICE — IMPLANTABLE DEVICE
Type: IMPLANTABLE DEVICE | Site: SHOULDER | Status: FUNCTIONAL
Brand: COMPREHENSIVE®

## 2024-07-17 DEVICE — IMPLANTABLE DEVICE
Type: IMPLANTABLE DEVICE | Site: SHOULDER | Status: FUNCTIONAL
Brand: COMPREHENSIVE® VRS

## 2024-07-17 DEVICE — IMPLANTABLE DEVICE
Type: IMPLANTABLE DEVICE | Site: SHOULDER | Status: FUNCTIONAL
Brand: COMPREHENSIVE REVERSE SHOULDER

## 2024-07-17 RX ORDER — DIPHENHYDRAMINE HCL 25 MG
25 CAPSULE ORAL EVERY 6 HOURS PRN
Status: DISCONTINUED | OUTPATIENT
Start: 2024-07-17 | End: 2024-07-17 | Stop reason: HOSPADM

## 2024-07-17 RX ORDER — OXYCODONE HYDROCHLORIDE 5 MG/1
5 TABLET ORAL EVERY 4 HOURS PRN
Status: DISCONTINUED | OUTPATIENT
Start: 2024-07-17 | End: 2024-07-18 | Stop reason: HOSPADM

## 2024-07-17 RX ORDER — NALOXONE HYDROCHLORIDE 0.4 MG/ML
0.1 INJECTION, SOLUTION INTRAMUSCULAR; INTRAVENOUS; SUBCUTANEOUS
Status: DISCONTINUED | OUTPATIENT
Start: 2024-07-17 | End: 2024-07-17 | Stop reason: HOSPADM

## 2024-07-17 RX ORDER — CEFAZOLIN SODIUM/WATER 2 G/20 ML
2 SYRINGE (ML) INTRAVENOUS SEE ADMIN INSTRUCTIONS
Status: DISCONTINUED | OUTPATIENT
Start: 2024-07-17 | End: 2024-07-17 | Stop reason: HOSPADM

## 2024-07-17 RX ORDER — NALOXONE HYDROCHLORIDE 0.4 MG/ML
0.2 INJECTION, SOLUTION INTRAMUSCULAR; INTRAVENOUS; SUBCUTANEOUS
Status: DISCONTINUED | OUTPATIENT
Start: 2024-07-17 | End: 2024-07-17 | Stop reason: HOSPADM

## 2024-07-17 RX ORDER — ONDANSETRON 4 MG/1
4 TABLET, ORALLY DISINTEGRATING ORAL EVERY 30 MIN PRN
Status: DISCONTINUED | OUTPATIENT
Start: 2024-07-17 | End: 2024-07-17 | Stop reason: HOSPADM

## 2024-07-17 RX ORDER — NALOXONE HYDROCHLORIDE 0.4 MG/ML
0.2 INJECTION, SOLUTION INTRAMUSCULAR; INTRAVENOUS; SUBCUTANEOUS
Status: DISCONTINUED | OUTPATIENT
Start: 2024-07-17 | End: 2024-07-18 | Stop reason: HOSPADM

## 2024-07-17 RX ORDER — BUPROPION HYDROCHLORIDE 300 MG/1
300 TABLET ORAL EVERY MORNING
Status: DISCONTINUED | OUTPATIENT
Start: 2024-07-18 | End: 2024-07-18 | Stop reason: HOSPADM

## 2024-07-17 RX ORDER — SIMVASTATIN 20 MG
20 TABLET ORAL DAILY
Status: DISCONTINUED | OUTPATIENT
Start: 2024-07-18 | End: 2024-07-18 | Stop reason: HOSPADM

## 2024-07-17 RX ORDER — APREPITANT 40 MG/1
40 CAPSULE ORAL ONCE
Status: COMPLETED | OUTPATIENT
Start: 2024-07-17 | End: 2024-07-17

## 2024-07-17 RX ORDER — ONDANSETRON 2 MG/ML
INJECTION INTRAMUSCULAR; INTRAVENOUS PRN
Status: DISCONTINUED | OUTPATIENT
Start: 2024-07-17 | End: 2024-07-17

## 2024-07-17 RX ORDER — NALOXONE HYDROCHLORIDE 0.4 MG/ML
0.4 INJECTION, SOLUTION INTRAMUSCULAR; INTRAVENOUS; SUBCUTANEOUS
Status: DISCONTINUED | OUTPATIENT
Start: 2024-07-17 | End: 2024-07-17 | Stop reason: HOSPADM

## 2024-07-17 RX ORDER — IBUPROFEN 600 MG/1
600 TABLET, FILM COATED ORAL EVERY 6 HOURS PRN
Status: DISCONTINUED | OUTPATIENT
Start: 2024-07-17 | End: 2024-07-18 | Stop reason: HOSPADM

## 2024-07-17 RX ORDER — DEXAMETHASONE SODIUM PHOSPHATE 4 MG/ML
4 INJECTION, SOLUTION INTRA-ARTICULAR; INTRALESIONAL; INTRAMUSCULAR; INTRAVENOUS; SOFT TISSUE
Status: DISCONTINUED | OUTPATIENT
Start: 2024-07-17 | End: 2024-07-17 | Stop reason: HOSPADM

## 2024-07-17 RX ORDER — BUPIVACAINE HYDROCHLORIDE 5 MG/ML
INJECTION, SOLUTION EPIDURAL; INTRACAUDAL
Status: COMPLETED | OUTPATIENT
Start: 2024-07-17 | End: 2024-07-17

## 2024-07-17 RX ORDER — OXYCODONE HYDROCHLORIDE 10 MG/1
10 TABLET ORAL EVERY 4 HOURS PRN
Status: DISCONTINUED | OUTPATIENT
Start: 2024-07-17 | End: 2024-07-18 | Stop reason: HOSPADM

## 2024-07-17 RX ORDER — TRANEXAMIC ACID 650 MG/1
1950 TABLET ORAL ONCE
Status: COMPLETED | OUTPATIENT
Start: 2024-07-17 | End: 2024-07-17

## 2024-07-17 RX ORDER — FENTANYL CITRATE 50 UG/ML
INJECTION, SOLUTION INTRAMUSCULAR; INTRAVENOUS PRN
Status: DISCONTINUED | OUTPATIENT
Start: 2024-07-17 | End: 2024-07-17

## 2024-07-17 RX ORDER — ONDANSETRON 2 MG/ML
4 INJECTION INTRAMUSCULAR; INTRAVENOUS EVERY 6 HOURS PRN
Status: DISCONTINUED | OUTPATIENT
Start: 2024-07-17 | End: 2024-07-18 | Stop reason: HOSPADM

## 2024-07-17 RX ORDER — CEFAZOLIN SODIUM 2 G/100ML
2 INJECTION, SOLUTION INTRAVENOUS EVERY 8 HOURS
Status: COMPLETED | OUTPATIENT
Start: 2024-07-17 | End: 2024-07-18

## 2024-07-17 RX ORDER — BISACODYL 10 MG
10 SUPPOSITORY, RECTAL RECTAL DAILY PRN
Status: DISCONTINUED | OUTPATIENT
Start: 2024-07-20 | End: 2024-07-18 | Stop reason: HOSPADM

## 2024-07-17 RX ORDER — EPHEDRINE SULFATE 50 MG/ML
INJECTION, SOLUTION INTRAMUSCULAR; INTRAVENOUS; SUBCUTANEOUS PRN
Status: DISCONTINUED | OUTPATIENT
Start: 2024-07-17 | End: 2024-07-17

## 2024-07-17 RX ORDER — SERTRALINE HYDROCHLORIDE 100 MG/1
200 TABLET, FILM COATED ORAL DAILY
Status: DISCONTINUED | OUTPATIENT
Start: 2024-07-18 | End: 2024-07-18 | Stop reason: HOSPADM

## 2024-07-17 RX ORDER — ONDANSETRON 4 MG/1
4 TABLET, ORALLY DISINTEGRATING ORAL EVERY 6 HOURS PRN
Status: DISCONTINUED | OUTPATIENT
Start: 2024-07-17 | End: 2024-07-18 | Stop reason: HOSPADM

## 2024-07-17 RX ORDER — TAMSULOSIN HYDROCHLORIDE 0.4 MG/1
0.4 CAPSULE ORAL DAILY
Status: DISCONTINUED | OUTPATIENT
Start: 2024-07-18 | End: 2024-07-18 | Stop reason: HOSPADM

## 2024-07-17 RX ORDER — PROCHLORPERAZINE MALEATE 10 MG
10 TABLET ORAL EVERY 6 HOURS PRN
Status: DISCONTINUED | OUTPATIENT
Start: 2024-07-17 | End: 2024-07-18 | Stop reason: HOSPADM

## 2024-07-17 RX ORDER — AMOXICILLIN 250 MG
1 CAPSULE ORAL 2 TIMES DAILY
Status: DISCONTINUED | OUTPATIENT
Start: 2024-07-17 | End: 2024-07-18 | Stop reason: HOSPADM

## 2024-07-17 RX ORDER — SODIUM CHLORIDE, SODIUM LACTATE, POTASSIUM CHLORIDE, CALCIUM CHLORIDE 600; 310; 30; 20 MG/100ML; MG/100ML; MG/100ML; MG/100ML
INJECTION, SOLUTION INTRAVENOUS CONTINUOUS
Status: DISCONTINUED | OUTPATIENT
Start: 2024-07-17 | End: 2024-07-17 | Stop reason: HOSPADM

## 2024-07-17 RX ORDER — FLUMAZENIL 0.1 MG/ML
0.2 INJECTION, SOLUTION INTRAVENOUS
Status: DISCONTINUED | OUTPATIENT
Start: 2024-07-17 | End: 2024-07-17 | Stop reason: HOSPADM

## 2024-07-17 RX ORDER — SODIUM CHLORIDE, SODIUM LACTATE, POTASSIUM CHLORIDE, CALCIUM CHLORIDE 600; 310; 30; 20 MG/100ML; MG/100ML; MG/100ML; MG/100ML
INJECTION, SOLUTION INTRAVENOUS CONTINUOUS
Status: DISCONTINUED | OUTPATIENT
Start: 2024-07-17 | End: 2024-07-18 | Stop reason: HOSPADM

## 2024-07-17 RX ORDER — SODIUM CHLORIDE, SODIUM LACTATE, POTASSIUM CHLORIDE, CALCIUM CHLORIDE 600; 310; 30; 20 MG/100ML; MG/100ML; MG/100ML; MG/100ML
INJECTION, SOLUTION INTRAVENOUS CONTINUOUS PRN
Status: DISCONTINUED | OUTPATIENT
Start: 2024-07-17 | End: 2024-07-17

## 2024-07-17 RX ORDER — PROPOFOL 10 MG/ML
INJECTION, EMULSION INTRAVENOUS PRN
Status: DISCONTINUED | OUTPATIENT
Start: 2024-07-17 | End: 2024-07-17

## 2024-07-17 RX ORDER — DIPHENHYDRAMINE HYDROCHLORIDE 50 MG/ML
25 INJECTION INTRAMUSCULAR; INTRAVENOUS EVERY 6 HOURS PRN
Status: DISCONTINUED | OUTPATIENT
Start: 2024-07-17 | End: 2024-07-17 | Stop reason: HOSPADM

## 2024-07-17 RX ORDER — LIDOCAINE HYDROCHLORIDE 20 MG/ML
INJECTION, SOLUTION INFILTRATION; PERINEURAL PRN
Status: DISCONTINUED | OUTPATIENT
Start: 2024-07-17 | End: 2024-07-17

## 2024-07-17 RX ORDER — PROPOFOL 10 MG/ML
INJECTION, EMULSION INTRAVENOUS CONTINUOUS PRN
Status: DISCONTINUED | OUTPATIENT
Start: 2024-07-17 | End: 2024-07-17

## 2024-07-17 RX ORDER — HYDROMORPHONE HYDROCHLORIDE 1 MG/ML
0.4 INJECTION, SOLUTION INTRAMUSCULAR; INTRAVENOUS; SUBCUTANEOUS
Status: DISCONTINUED | OUTPATIENT
Start: 2024-07-17 | End: 2024-07-18 | Stop reason: HOSPADM

## 2024-07-17 RX ORDER — HYDROMORPHONE HYDROCHLORIDE 1 MG/ML
0.2 INJECTION, SOLUTION INTRAMUSCULAR; INTRAVENOUS; SUBCUTANEOUS EVERY 5 MIN PRN
Status: DISCONTINUED | OUTPATIENT
Start: 2024-07-17 | End: 2024-07-17 | Stop reason: HOSPADM

## 2024-07-17 RX ORDER — NALOXONE HYDROCHLORIDE 0.4 MG/ML
0.4 INJECTION, SOLUTION INTRAMUSCULAR; INTRAVENOUS; SUBCUTANEOUS
Status: DISCONTINUED | OUTPATIENT
Start: 2024-07-17 | End: 2024-07-18 | Stop reason: HOSPADM

## 2024-07-17 RX ORDER — ASPIRIN 81 MG/1
162 TABLET ORAL DAILY
Status: DISCONTINUED | OUTPATIENT
Start: 2024-07-18 | End: 2024-07-18 | Stop reason: HOSPADM

## 2024-07-17 RX ORDER — HYDROMORPHONE HYDROCHLORIDE 1 MG/ML
0.2 INJECTION, SOLUTION INTRAMUSCULAR; INTRAVENOUS; SUBCUTANEOUS
Status: DISCONTINUED | OUTPATIENT
Start: 2024-07-17 | End: 2024-07-18 | Stop reason: HOSPADM

## 2024-07-17 RX ORDER — HYDROMORPHONE HYDROCHLORIDE 1 MG/ML
0.4 INJECTION, SOLUTION INTRAMUSCULAR; INTRAVENOUS; SUBCUTANEOUS EVERY 5 MIN PRN
Status: DISCONTINUED | OUTPATIENT
Start: 2024-07-17 | End: 2024-07-17 | Stop reason: HOSPADM

## 2024-07-17 RX ORDER — FENTANYL CITRATE 50 UG/ML
50 INJECTION, SOLUTION INTRAMUSCULAR; INTRAVENOUS EVERY 5 MIN PRN
Status: DISCONTINUED | OUTPATIENT
Start: 2024-07-17 | End: 2024-07-17 | Stop reason: HOSPADM

## 2024-07-17 RX ORDER — CEFAZOLIN SODIUM/WATER 2 G/20 ML
2 SYRINGE (ML) INTRAVENOUS
Status: COMPLETED | OUTPATIENT
Start: 2024-07-17 | End: 2024-07-17

## 2024-07-17 RX ORDER — ONDANSETRON 2 MG/ML
4 INJECTION INTRAMUSCULAR; INTRAVENOUS EVERY 30 MIN PRN
Status: DISCONTINUED | OUTPATIENT
Start: 2024-07-17 | End: 2024-07-17 | Stop reason: HOSPADM

## 2024-07-17 RX ORDER — FENTANYL CITRATE 50 UG/ML
25-50 INJECTION, SOLUTION INTRAMUSCULAR; INTRAVENOUS
Status: DISCONTINUED | OUTPATIENT
Start: 2024-07-17 | End: 2024-07-17 | Stop reason: HOSPADM

## 2024-07-17 RX ORDER — FENTANYL CITRATE 50 UG/ML
25 INJECTION, SOLUTION INTRAMUSCULAR; INTRAVENOUS EVERY 5 MIN PRN
Status: DISCONTINUED | OUTPATIENT
Start: 2024-07-17 | End: 2024-07-17 | Stop reason: HOSPADM

## 2024-07-17 RX ORDER — ACETAMINOPHEN 325 MG/1
975 TABLET ORAL EVERY 8 HOURS
Status: DISCONTINUED | OUTPATIENT
Start: 2024-07-17 | End: 2024-07-18 | Stop reason: HOSPADM

## 2024-07-17 RX ORDER — LIDOCAINE 40 MG/G
CREAM TOPICAL
Status: DISCONTINUED | OUTPATIENT
Start: 2024-07-17 | End: 2024-07-18 | Stop reason: HOSPADM

## 2024-07-17 RX ORDER — POLYETHYLENE GLYCOL 3350 17 G/17G
17 POWDER, FOR SOLUTION ORAL DAILY
Status: DISCONTINUED | OUTPATIENT
Start: 2024-07-18 | End: 2024-07-18 | Stop reason: HOSPADM

## 2024-07-17 RX ORDER — ACETAMINOPHEN 325 MG/1
650 TABLET ORAL EVERY 4 HOURS PRN
Status: DISCONTINUED | OUTPATIENT
Start: 2024-07-20 | End: 2024-07-18 | Stop reason: HOSPADM

## 2024-07-17 RX ORDER — LABETALOL HYDROCHLORIDE 5 MG/ML
10 INJECTION, SOLUTION INTRAVENOUS
Status: DISCONTINUED | OUTPATIENT
Start: 2024-07-17 | End: 2024-07-17 | Stop reason: HOSPADM

## 2024-07-17 RX ORDER — OXYCODONE HYDROCHLORIDE 10 MG/1
10 TABLET ORAL
Status: DISCONTINUED | OUTPATIENT
Start: 2024-07-17 | End: 2024-07-17 | Stop reason: HOSPADM

## 2024-07-17 RX ORDER — OXYCODONE HYDROCHLORIDE 5 MG/1
5 TABLET ORAL
Status: DISCONTINUED | OUTPATIENT
Start: 2024-07-17 | End: 2024-07-17 | Stop reason: HOSPADM

## 2024-07-17 RX ORDER — VANCOMYCIN HYDROCHLORIDE 1 G/20ML
INJECTION, POWDER, LYOPHILIZED, FOR SOLUTION INTRAVENOUS PRN
Status: DISCONTINUED | OUTPATIENT
Start: 2024-07-17 | End: 2024-07-17 | Stop reason: HOSPADM

## 2024-07-17 RX ADMIN — PHENYLEPHRINE HYDROCHLORIDE 150 MCG: 10 INJECTION INTRAVENOUS at 15:02

## 2024-07-17 RX ADMIN — CEFAZOLIN SODIUM 2 G: 2 INJECTION, SOLUTION INTRAVENOUS at 21:50

## 2024-07-17 RX ADMIN — PHENYLEPHRINE HYDROCHLORIDE 200 MCG: 10 INJECTION INTRAVENOUS at 14:42

## 2024-07-17 RX ADMIN — FENTANYL CITRATE 25 MCG: 50 INJECTION INTRAMUSCULAR; INTRAVENOUS at 15:43

## 2024-07-17 RX ADMIN — SODIUM CHLORIDE, POTASSIUM CHLORIDE, SODIUM LACTATE AND CALCIUM CHLORIDE: 600; 310; 30; 20 INJECTION, SOLUTION INTRAVENOUS at 20:13

## 2024-07-17 RX ADMIN — EPHEDRINE SULFATE 5 MG: 5 INJECTION INTRAVENOUS at 15:08

## 2024-07-17 RX ADMIN — SODIUM CHLORIDE, POTASSIUM CHLORIDE, SODIUM LACTATE AND CALCIUM CHLORIDE: 600; 310; 30; 20 INJECTION, SOLUTION INTRAVENOUS at 15:10

## 2024-07-17 RX ADMIN — LIDOCAINE HYDROCHLORIDE 100 MG: 20 INJECTION, SOLUTION INFILTRATION; PERINEURAL at 14:13

## 2024-07-17 RX ADMIN — ONDANSETRON 4 MG: 2 INJECTION INTRAMUSCULAR; INTRAVENOUS at 17:50

## 2024-07-17 RX ADMIN — SENNOSIDES AND DOCUSATE SODIUM 1 TABLET: 50; 8.6 TABLET ORAL at 21:21

## 2024-07-17 RX ADMIN — FENTANYL CITRATE 50 MCG: 50 INJECTION INTRAMUSCULAR; INTRAVENOUS at 14:13

## 2024-07-17 RX ADMIN — PROPOFOL 200 MG: 10 INJECTION, EMULSION INTRAVENOUS at 14:15

## 2024-07-17 RX ADMIN — PHENYLEPHRINE HYDROCHLORIDE 100 MCG: 10 INJECTION INTRAVENOUS at 14:50

## 2024-07-17 RX ADMIN — SODIUM CHLORIDE, POTASSIUM CHLORIDE, SODIUM LACTATE AND CALCIUM CHLORIDE: 600; 310; 30; 20 INJECTION, SOLUTION INTRAVENOUS at 13:59

## 2024-07-17 RX ADMIN — OMEPRAZOLE 40 MG: 20 CAPSULE, DELAYED RELEASE ORAL at 22:50

## 2024-07-17 RX ADMIN — PHENYLEPHRINE HYDROCHLORIDE 100 MCG: 10 INJECTION INTRAVENOUS at 14:48

## 2024-07-17 RX ADMIN — EPHEDRINE SULFATE 5 MG: 5 INJECTION INTRAVENOUS at 16:03

## 2024-07-17 RX ADMIN — FENTANYL CITRATE 25 MCG: 50 INJECTION INTRAMUSCULAR; INTRAVENOUS at 15:01

## 2024-07-17 RX ADMIN — BUPIVACAINE HYDROCHLORIDE 10 ML: 5 INJECTION, SOLUTION EPIDURAL; INTRACAUDAL at 13:45

## 2024-07-17 RX ADMIN — Medication 1 LOZENGE: at 21:49

## 2024-07-17 RX ADMIN — APREPITANT 40 MG: 40 CAPSULE ORAL at 13:09

## 2024-07-17 RX ADMIN — PHENYLEPHRINE HYDROCHLORIDE 0.5 MCG/KG/MIN: 10 INJECTION INTRAVENOUS at 14:46

## 2024-07-17 RX ADMIN — Medication 2 G: at 13:59

## 2024-07-17 RX ADMIN — PHENYLEPHRINE HYDROCHLORIDE 100 MCG: 10 INJECTION INTRAVENOUS at 14:39

## 2024-07-17 RX ADMIN — PHENYLEPHRINE HYDROCHLORIDE 100 MCG: 10 INJECTION INTRAVENOUS at 14:35

## 2024-07-17 RX ADMIN — MIDAZOLAM 2 MG: 1 INJECTION INTRAMUSCULAR; INTRAVENOUS at 13:59

## 2024-07-17 RX ADMIN — ALBUMIN HUMAN: 0.05 INJECTION, SOLUTION INTRAVENOUS at 16:40

## 2024-07-17 RX ADMIN — PROPOFOL 50 MCG/KG/MIN: 10 INJECTION, EMULSION INTRAVENOUS at 14:33

## 2024-07-17 RX ADMIN — PHENYLEPHRINE HYDROCHLORIDE 0.5 MCG/KG/MIN: 10 INJECTION INTRAVENOUS at 15:02

## 2024-07-17 RX ADMIN — FENTANYL CITRATE 50 MCG: 50 INJECTION INTRAMUSCULAR; INTRAVENOUS at 13:44

## 2024-07-17 RX ADMIN — TRANEXAMIC ACID 1950 MG: 650 TABLET ORAL at 12:31

## 2024-07-17 RX ADMIN — ACETAMINOPHEN 975 MG: 325 TABLET, FILM COATED ORAL at 21:21

## 2024-07-17 RX ADMIN — PHENYLEPHRINE HYDROCHLORIDE 100 MCG: 10 INJECTION INTRAVENOUS at 14:37

## 2024-07-17 RX ADMIN — MIDAZOLAM 1 MG: 1 INJECTION INTRAMUSCULAR; INTRAVENOUS at 13:44

## 2024-07-17 RX ADMIN — SUCCINYLCHOLINE CHLORIDE 100 MG: 20 INJECTION, SOLUTION INTRAMUSCULAR; INTRAVENOUS; PARENTERAL at 14:16

## 2024-07-17 RX ADMIN — BUPIVACAINE 10 ML: 13.3 INJECTION, SUSPENSION, LIPOSOMAL INFILTRATION at 13:45

## 2024-07-17 ASSESSMENT — ACTIVITIES OF DAILY LIVING (ADL)
ADLS_ACUITY_SCORE: 22
ADLS_ACUITY_SCORE: 20
EQUIPMENT_CURRENTLY_USED_AT_HOME: OTHER (SEE COMMENTS)
CHANGE_IN_FUNCTIONAL_STATUS_SINCE_ONSET_OF_CURRENT_ILLNESS/INJURY: NO
ADLS_ACUITY_SCORE: 29
ADLS_ACUITY_SCORE: 22
ADLS_ACUITY_SCORE: 28
ADLS_ACUITY_SCORE: 22
ADLS_ACUITY_SCORE: 22
FALL_HISTORY_WITHIN_LAST_SIX_MONTHS: NO
ADLS_ACUITY_SCORE: 22

## 2024-07-17 NOTE — ANESTHESIA CARE TRANSFER NOTE
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    Anesthesia Type:   General     Note:    Oropharynx: oropharynx clear of all foreign objects and spontaneously breathing  Level of Consciousness: drowsy  Oxygen Supplementation: face mask  Level of Supplemental Oxygen (L/min / FiO2): 8  Independent Airway: airway patency satisfactory and stable  Dentition: dentition unchanged  Vital Signs Stable: post-procedure vital signs reviewed and stable  Report to RN Given: handoff report given  Patient transferred to: PACU    Handoff Report: Identifed the Patient, Identified the Reponsible Provider, Reviewed the pertinent medical history, Discussed the surgical course, Reviewed Intra-OP anesthesia mangement and issues during anesthesia, Set expectations for post-procedure period and Allowed opportunity for questions and acknowledgement of understanding  Vitals:  Vitals Value Taken Time   /72 07/17/24 1811   Temp     Pulse 81 07/17/24 1815   Resp 19 07/17/24 1815   SpO2 98 % 07/17/24 1815   Vitals shown include unfiled device data.    Electronically Signed By: CIARA Ward CRNA  July 17, 2024  6:16 PM

## 2024-07-17 NOTE — ANESTHESIA PROCEDURE NOTES
Brachial plexus Procedure Note    Pre-Procedure   Staff -        Anesthesiologist:  Elio Tello MD       Performed By: anesthesiologist       Location: pre-op       Pre-Anesthestic Checklist: patient identified, IV checked, site marked, risks and benefits discussed, informed consent, monitors and equipment checked, pre-op evaluation, at physician/surgeon's request and post-op pain management  Timeout:       Correct Patient: Yes        Correct Procedure: Yes        Correct Site: Yes        Correct Position: Yes        Correct Laterality: Yes        Site Marked: Yes  Procedure Documentation  Procedure: Brachial plexus       Diagnosis: POST OPERATIVE PAIN       Laterality: left       Patient Position: sitting       Patient Prep/Sterile Barriers: sterile gloves, mask       Skin prep: Chloraprep (interscalene approach).       Needle Type: short bevel       Needle Gauge: 21.        Needle Length (millimeters): 110        Ultrasound guided       1. Ultrasound was used to identify targeted nerve, plexus, vascular marker, or fascial plane and place a needle adjacent to it in real-time.       2. Ultrasound was used to visualize the spread of anesthetic in close proximity to the above referenced structure.       3. A permanent image is entered into the patient's record.    Assessment/Narrative         The placement was negative for: blood aspirated, painful injection and site bleeding       Paresthesias: No.       Bolus given via needle..        Secured via.        Insertion/Infusion Method: Single Shot       Complications: none       Injection made incrementally with aspirations every 5 mL.    Medication(s) Administered   Bupivacaine 0.5% PF (Infiltration) - Infiltration   10 mL - 7/17/2024 1:45:00 PM  Bupivacaine liposome (Exparel) 1.3% LA inj susp (Infiltration) - Infiltration   10 mL - 7/17/2024 1:45:00 PM    FOR [facility] ([facility]) ONLY:   Pain Team Contact information: please page the Pain Team Via [employer].  "Search \"Pain\". During daytime hours, please page the attending first. At night please page the resident first.      "

## 2024-07-17 NOTE — ANESTHESIA PROCEDURE NOTES
Airway       Patient location during procedure: OR       Procedure Start/Stop Times: 7/17/2024 2:17 PM  Staff -        CRNA: Hermelindo Lai APRN CRNA       Performed By: CRNA  Consent for Airway        Urgency: elective  Indications and Patient Condition       Indications for airway management: anais-procedural       Induction type:intravenous       Mask difficulty assessment: 1 - vent by mask    Final Airway Details       Final airway type: endotracheal airway       Successful airway: ETT - single and Oral  Endotracheal Airway Details        ETT size (mm): 8.0       Cuffed: yes       Successful intubation technique: video laryngoscopy       VL Blade Size: Glidescope 4 (S4 hyperangle)       Grade View of Cords: 1       Adjucts: stylet       Position: Right       Measured from: lips       Secured at (cm): 24       Bite block used: None    Post intubation assessment        Placement verified by: capnometry, equal breath sounds and chest rise        Number of attempts at approach: 1       Number of other approaches attempted: 0       Secured with: tape       Ease of procedure: easy       Dentition: Intact and Unchanged    Medication(s) Administered   Medication Administration Time: 7/17/2024 2:17 PM

## 2024-07-17 NOTE — BRIEF OP NOTE
"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  (H24) [T84.59XA, Z96.612]  Post-operative diagnosis Same as pre-operative diagnosis    Procedure: Explantation of Shoulder Antibiotic Spacer,, Left - Shoulder  Revision to Reverse Total Shoulder Arthroplasty, Left - Shoulder    Surgeon: Surgeons and Role:     * Kesha Sewell MD - Primary     * Ar Moore MD - Resident - Assisting     * Mekhi Casanova MD - Resident - Assisting     * Franklin Coates MD - Fellow - Assisting  Anesthesia: General with Block   Estimated Blood Loss: 300 ml    Drains: None  Specimens:   ID Type Source Tests Collected by Time Destination   A : LEFT SHOULDER A Tissue Shoulder, Left ANAEROBIC BACTERIAL CULTURE ROUTINE, AEROBIC BACTERIAL CULTURE ROUTINE Kesha Sewell MD 7/17/2024  3:57 PM    B : LEFT SHOULDER B Tissue Shoulder, Left ANAEROBIC BACTERIAL CULTURE ROUTINE, AEROBIC BACTERIAL CULTURE ROUTINE Kesha Sewell MD 7/17/2024  4:04 PM    C : LEFT SHOULDER C Tissue Shoulder, Left ANAEROBIC BACTERIAL CULTURE ROUTINE, AEROBIC BACTERIAL CULTURE ROUTINE Kesha Sewell MD 7/17/2024  4:04 PM      Findings:   Severe arthrofibrosis, profound glenoid bone loss, medial humeral bone loss .  Complications: None.  Implants:   Implant Name Type Inv. Item Serial No.  Lot No. LRB No. Used Action   PIN STEINMANN BIOMET REV SHLDER 3.2MM 9\" THRD SS [id number] - [id number] Wire PIN STEINMANN BIOMET REV SHLDER 3.2MM 9\" THRD SS [id number]  [employer] [id number] Left 1 Used as a Supply   IMP BASEPLATE CMPRH MN TPR ADPR VAULT RECON SYS [id number] - [id number] Total Joint Component/Insert IMP BASEPLATE CMPRH MN TPR ADPR VAULT RECON SYS [id number]  [employer] [id number] Left 1 Implanted   IMP BASEPLATE CMPRH FST GD VAULT RECON SYS STRL LF [id number] - [id number] Total Joint Component/Insert IMP " BASEPLATE CMPRH FST GD VAULT RECON SYS STRL LF [id number]  [employer] [id number] Left 1 Implanted   IMP SCR CENTRAL ZIM REVERSE SHOULDER  6.5X45MM  [id number] - [id number] Metallic Hardware/[place] IMP SCR CENTRAL ZIM REVERSE SHOULDER  6.5X45MM  [id number]  [employer] [id number] Left 1 Implanted   IMP SCR LOCKING BIOM REV SHLDR 3.5 HEX 4.23M73HW 081652 - [id number] Metallic Hardware/[place] IMP SCR LOCKING BIOM REV SHLDR 3.5 HEX [id number] 307909  [employer] [id number] Left 1 Implanted   IMP SCR LOCKING BIOM REV SHLDR 3.5 HEX 4.15M16SR 486292 - [id number] Metallic Hardware/[place] IMP SCR LOCKING BIOM REV SHLDR 3.5 HEX [id number] 308462  [employer] [id number] Left 1 Implanted   IMP SCR LOCKING BIOM REV SHLDR 3.5 HEX 4.30K37ON 550622 - [id number] Metallic Hardware/[place] IMP SCR LOCKING BIOM REV SHLDR 3.5 HEX [id number]  [employer] [id number] Left 1 Implanted   IMP GLENOSPHERE BIOM REV SHLDR 36MM STD 989714 - [id number] Total Joint Component/Insert IMP GLENOSPHERE BIOM REV SHLDR 36MM STD 090765  [employer] [id number] Left 1 Implanted   IMP STEM MINI HUMERAL BIOM SHLDR 9X83MM 436541 - [id number] Total Joint Component/Insert IMP STEM MINI HUMERAL BIOM SHLDR 9X83MM 807178  [employer] [id number] Left 1 Implanted   IMP HUMERAL TRAY ZIM RVRS SHLDR +3MM STD [id number] - [id number] Total Joint Component/Insert IMP HUMERAL TRAY ZIM RVRS SHLDR +3MM STD [id number]  [employer] [id number] Left 1 Implanted   IMP BEARING HUMERAL ZIM 36MM STD PRLNG [id number] - [id number] Total Joint Component/Insert IMP BEARING HUMERAL ZIM 36MM STD PRLNG [id number]  [employer] [id number] Left 1 Implanted

## 2024-07-17 NOTE — ANESTHESIA PREPROCEDURE EVALUATION
Anesthesia Pre-Procedure Evaluation    Patient: Prosper Gilmore   MRN: [id number] : 1960        Procedure : Procedure(s):  Explantation of Shoulder Antibiotic Spacer,  Revision to Reverse Total Shoulder Arthroplasty          No past medical history on file.   Past Surgical History:   Procedure Laterality Date    IR LUMBAR PUNCTURE  2023      Allergies   Allergen Reactions    Hydrocodone-Acetaminophen      hallucinations    Other (Do Not Use)      Had surgery on 3/5/24 at [facility]. Could not sleep after surgery. 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)   Depression   History of Total Replacement of Left Shoulder Joint   Prediabetes   Restless Leg   Essential Hypertension   Hyperlipidemia   Obesity   Obsessive-Compulsive Disorder   Non-Recurrent Unilateral Inguinal Hernia Without Obstruction Or Gangrene   Umbilical Hernia Without Obstruction and Without Gangrene   Bph (Benign Prostatic Hyperplasia)   Insomnia, Unspecified Type       Anesthesia Evaluation        History of anesthetic complications       ROS/MED HX  ENT/Pulmonary:     (+) sleep apnea, doesn't use CPAP,                                      Neurologic:       Cardiovascular:     (+) Dyslipidemia hypertension- -   -  - -                                      METS/Exercise Tolerance:     Hematologic:       Musculoskeletal:       GI/Hepatic:  - neg GI/hepatic ROS  (-) GERD   Renal/Genitourinary:       Endo: Comment: Pre DM    (+)               Obesity,       Psychiatric/Substance Use:     (+) psychiatric history depression (OCD)      "  Infectious Disease:       Malignancy:  - neg malignancy ROS     Other:            Physical Exam    Airway        Mallampati: II   TM distance: > 3 FB   Neck ROM: full   Mouth opening: > 3 cm    Respiratory Devices and Support         Dental  no notable dental history         Cardiovascular   cardiovascular exam normal       Rhythm and rate: regular and normal     Pulmonary   pulmonary exam normal        breath sounds clear to auscultation           OUTSIDE LABS:  BMP CBC WNL 6/24/24    CBC:   Lab Results   Component Value Date    WBC 6.4 04/17/2017    HGB 15.1 04/17/2017    HCT 44.7 04/17/2017     04/17/2017     BMP:   Lab Results   Component Value Date     04/17/2017    POTASSIUM 4.0 04/17/2017    CHLORIDE 105 04/17/2017    CO2 27 04/17/2017    BUN 19 04/17/2017    CR 0.93 04/17/2017     (H) 04/17/2017     COAGS: No results found for: \"PTT\", \"INR\", \"FIBR\"  POC: No results found for: \"BGM\", \"HCG\", \"HCGS\"  HEPATIC:   Lab Results   Component Value Date    ALBUMIN 3.5 04/17/2017    PROTTOTAL 7.4 04/17/2017    ALT 45 04/17/2017    AST 30 04/17/2017    ALKPHOS 112 04/17/2017    BILITOTAL 0.4 04/17/2017     OTHER:   Lab Results   Component Value Date    NANCY 8.5 04/17/2017       Anesthesia Plan    ASA Status:  3       Anesthesia Type: General.     - Airway: ETT   Induction: Intravenous, Propofol.   Maintenance: Balanced.        Consents    Anesthesia Plan(s) and associated risks, benefits, and realistic alternatives discussed. Questions answered and patient/representative(s) expressed understanding.     - Discussed:     - Discussed with:  Patient      - Extended Intubation/Ventilatory Support Discussed: No.      - Patient is DNR/DNI Status: No          Postoperative Care    Pain management: IV analgesics, Oral pain medications, Peripheral nerve block (Single Shot), Multi-modal analgesia.   PONV prophylaxis: Ondansetron (or other 5HT-3), Background Propofol Infusion, Aprepitant     Comments:            "    Grisel Burleson MD    I have reviewed the pertinent notes and labs in the chart from the past 30 days and (re)examined the patient.  Any updates or changes from those notes are reflected in this note.             # Drug Induced Platelet Defect: home medication list includes an antiplatelet medication

## 2024-07-18 ENCOUNTER — APPOINTMENT (OUTPATIENT)
Dept: OCCUPATIONAL THERAPY | Facility: CLINIC | Age: 64
DRG: 483 | End: 2024-07-18
Attending: ORTHOPAEDIC SURGERY
Payer: COMMERCIAL

## 2024-07-18 VITALS
WEIGHT: 258.6 LBS | SYSTOLIC BLOOD PRESSURE: 121 MMHG | OXYGEN SATURATION: 96 % | HEART RATE: 79 BPM | BODY MASS INDEX: 37.02 KG/M2 | HEIGHT: 70 IN | DIASTOLIC BLOOD PRESSURE: 66 MMHG | RESPIRATION RATE: 16 BRPM | TEMPERATURE: 99.1 F

## 2024-07-18 LAB
GLUCOSE BLDC GLUCOMTR-MCNC: 238 MG/DL (ref 70–99)
HGB BLD-MCNC: 13.1 G/DL (ref 13.3–17.7)

## 2024-07-18 PROCEDURE — 36415 COLL VENOUS BLD VENIPUNCTURE: CPT | Performed by: ORTHOPAEDIC SURGERY

## 2024-07-18 PROCEDURE — 97535 SELF CARE MNGMENT TRAINING: CPT | Mod: GO | Performed by: OCCUPATIONAL THERAPIST

## 2024-07-18 PROCEDURE — 99232 SBSQ HOSP IP/OBS MODERATE 35: CPT | Performed by: INTERNAL MEDICINE

## 2024-07-18 PROCEDURE — 250N000013 HC RX MED GY IP 250 OP 250 PS 637: Performed by: ORTHOPAEDIC SURGERY

## 2024-07-18 PROCEDURE — 250N000011 HC RX IP 250 OP 636: Mod: JZ | Performed by: ORTHOPAEDIC SURGERY

## 2024-07-18 PROCEDURE — 85018 HEMOGLOBIN: CPT | Performed by: ORTHOPAEDIC SURGERY

## 2024-07-18 PROCEDURE — 250N000013 HC RX MED GY IP 250 OP 250 PS 637: Performed by: INTERNAL MEDICINE

## 2024-07-18 PROCEDURE — 5A09357 ASSISTANCE WITH RESPIRATORY VENTILATION, LESS THAN 24 CONSECUTIVE HOURS, CONTINUOUS POSITIVE AIRWAY PRESSURE: ICD-10-PCS | Performed by: ORTHOPAEDIC SURGERY

## 2024-07-18 PROCEDURE — 97165 OT EVAL LOW COMPLEX 30 MIN: CPT | Mod: GO | Performed by: OCCUPATIONAL THERAPIST

## 2024-07-18 PROCEDURE — 97110 THERAPEUTIC EXERCISES: CPT | Mod: GO | Performed by: OCCUPATIONAL THERAPIST

## 2024-07-18 RX ORDER — POLYETHYLENE GLYCOL 3350 17 G/17G
17 POWDER, FOR SOLUTION ORAL DAILY
Qty: 510 G | Refills: 0 | Status: SHIPPED | OUTPATIENT
Start: 2024-07-18

## 2024-07-18 RX ORDER — IBUPROFEN 600 MG/1
600 TABLET, FILM COATED ORAL EVERY 6 HOURS PRN
Qty: 60 TABLET | Refills: 3 | Status: SHIPPED | OUTPATIENT
Start: 2024-07-18

## 2024-07-18 RX ORDER — ACETAMINOPHEN 325 MG/1
650 TABLET ORAL EVERY 4 HOURS PRN
Qty: 60 TABLET | Refills: 3 | Status: SHIPPED | OUTPATIENT
Start: 2024-07-20

## 2024-07-18 RX ORDER — OXYCODONE HYDROCHLORIDE 5 MG/1
5 TABLET ORAL EVERY 4 HOURS PRN
Qty: 30 TABLET | Refills: 0 | Status: SHIPPED | OUTPATIENT
Start: 2024-07-18

## 2024-07-18 RX ORDER — ASPIRIN 81 MG/1
162 TABLET ORAL DAILY
Qty: 56 TABLET | Refills: 0 | Status: SHIPPED | OUTPATIENT
Start: 2024-07-19 | End: 2024-08-16

## 2024-07-18 RX ADMIN — OXYCODONE HYDROCHLORIDE 10 MG: 10 TABLET ORAL at 08:51

## 2024-07-18 RX ADMIN — TAMSULOSIN HYDROCHLORIDE 0.4 MG: 0.4 CAPSULE ORAL at 08:08

## 2024-07-18 RX ADMIN — BUPROPION HYDROCHLORIDE 300 MG: 300 TABLET, EXTENDED RELEASE ORAL at 08:08

## 2024-07-18 RX ADMIN — OMEPRAZOLE 40 MG: 20 CAPSULE, DELAYED RELEASE ORAL at 08:08

## 2024-07-18 RX ADMIN — ASPIRIN 162 MG: 81 TABLET, COATED ORAL at 08:08

## 2024-07-18 RX ADMIN — CEFAZOLIN SODIUM 2 G: 2 INJECTION, SOLUTION INTRAVENOUS at 05:19

## 2024-07-18 RX ADMIN — ACETAMINOPHEN 975 MG: 325 TABLET, FILM COATED ORAL at 05:10

## 2024-07-18 RX ADMIN — SIMVASTATIN 20 MG: 20 TABLET, FILM COATED ORAL at 08:08

## 2024-07-18 RX ADMIN — SERTRALINE HYDROCHLORIDE 200 MG: 100 TABLET ORAL at 08:08

## 2024-07-18 RX ADMIN — Medication 1 LOZENGE: at 05:26

## 2024-07-18 RX ADMIN — IBUPROFEN 600 MG: 600 TABLET, FILM COATED ORAL at 08:13

## 2024-07-18 ASSESSMENT — ACTIVITIES OF DAILY LIVING (ADL)
ADLS_ACUITY_SCORE: 28
ADLS_ACUITY_SCORE: 28
ADLS_ACUITY_SCORE: 27
ADLS_ACUITY_SCORE: 28
PREVIOUS_RESPONSIBILITIES: WORK;DRIVING;FINANCES;MEDICATION MANAGEMENT;YARDWORK;SHOPPING;LAUNDRY;HOUSEKEEPING;MEAL PREP
ADLS_ACUITY_SCORE: 28
ADLS_ACUITY_SCORE: 27
ADLS_ACUITY_SCORE: 28
ADLS_ACUITY_SCORE: 28

## 2024-07-18 NOTE — PLAN OF CARE
Goal Outcome Evaluation:      Plan of Care Reviewed With: patient    Overall Patient Progress: improvingOverall Patient Progress: improving    Outcome Evaluation: Adequate for discharge to home, passed OT. Pain well controlled.      VS: VSS  Temp: 99.1  F (37.3  C) Temp src: Oral BP: 121/66 Pulse: 79   Resp: 16 SpO2: 96 % O2 Device: None (Room air)    O2: >90% on RA, denies SOB or CP   Output: Voiding without difficulty in BR   Last BM: LBM 7/17 per pt, +flatus, BS active   Activity: Pt up ind, NWB to LUE   Skin: Skin intact ex incision to LUE   Pain: Pain to LUE managed with scheduled tylenol, ibuprofen, and oxycodone. Ice packs utilized.    CMS: Intact ex for remaining numbness to L thumb which MD is aware. Radial pulse +2, able to wiggle fingers.    Dressing: Dressing to LUE CDI with 2 small spots of shadow drainage. Ice applied.    Diet: Regular diet, denies N/V. Adequate intake of PO fluids.   LDA: PIV removed   Equipment: CPAP, immobilizer, personal belongings   Plan: Discharge to home today   Additional Info:      Patient vital signs are at baseline: Yes  Patient able to ambulate as they were prior to admission or with assist devices provided by therapies during their stay:  Yes  Patient MUST void prior to discharge:  Yes  Patient able to tolerate oral intake:  Yes  Pain has adequate pain control using Oral analgesics:  Yes  Does patient have an identified :  Yes  Has goal D/C date and time been discussed with patient:  Yes    DISCHARGE SUMMARY    Pt discharging to: Home  Transportation: Family  AVS given and discussed: Pt was given AVS and pt states understanding of content. Pt has no further questions.   Stoplight Tool given and discussed: Yes, no further questions.  Medications given: Yes, discussed. No further questions.   Belongings returned: Yes, ensured all belongings packed and sent with pt. No items in security.   Comments: Escorted safely to elevators. Pt left at 1140am

## 2024-07-18 NOTE — DISCHARGE SUMMARY
ORTHOPAEDIC SURGERY DISCHARGE SUMMARY     Date of Admission: 7/17/2024  Date of Discharge: 7/18/2024 11:23 AM  Disposition: Home  Staff Physician: No att. 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 Gilmore is a 63 year old RHD man who was referred by Dr. HERMAN Dickinson for the patient's complex shoulder condition. He had an anatomic TSA done approximately 22 years ago and it performed well for quite some time. He describes that the original TSA was done for end stage arthrosis with possible collapse. 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 He worked as a referee within pro or semi-pro leagues. He ultimately developed loosening of his all poly cemented glenoid component and underwent resection of the implants with Dr. Dickinson and placement of a spacer on 3/6/24. Cultures grew C acnes and he has received antibiotics (IV Ceftriaxone) with a stop date planned for 5/2/24 (his ID physician is Dr. Michael in [facility]).     HOSPITAL COURSE:    The patient was admitted following the above listed procedures for pain control and rehabilitation. Prosper Gilmore did well post-operatively. Medicine was consulted post operatively to aid in management of medical co-morbidities. The patient received routine nursing cares and at the time of discharge was medically stable. Vital signs were stable throughout admission. The patient is tolerating a regular diet and is voiding spontaneously. All PT/OT goals have been met for safe mobility. Pain is now controlled on oral medications which will be available on discharge. Stool softeners have been used while taking pain medications to help prevent constipation. Prosper Gilmore  is deemed medically safe to discharge.     Antibiotics:  Ancef given periop   DVT prophylaxis:  See below  PT Progress:  Has met PT/OT goals for safe mobility.  Pain Meds:  Weaned off all IV pain meds by discharge.  Inpatient Events: No significant events or complications.     PHYSICAL EXAM:    See orthopedic progress note from day of discharge     FOLLOWUP:      Future Appointments   Date Time Provider Department Center   7/29/2024  1:10 PM Kesha Sewell MD [facility]       Orthopaedic Surgery appointments are at the [facility] ([address] [place] [address]). Call [phone] to schedule a follow-up appointment at this location with your provider.     PLANNED DISCHARGE ORDERS:      Discharge Medication List as of 7/18/2024 10:26 AM        START taking these medications    Details   acetaminophen (TYLENOL) 325 MG tablet Take 2 tablets (650 mg) by mouth every 4 hours as needed for other (For optimal non-opioid multimodal pain management to improve pain control.), Disp-60 tablet, R-3, E-Prescribe      !! aspirin 81 MG EC tablet Take 2 tablets (162 mg) by mouth daily for 28 days, Disp-56 tablet, R-0, E-Prescribe      ibuprofen (ADVIL/MOTRIN) 600 MG tablet Take 1 tablet (600 mg) by mouth every 6 hours as needed for other (inflammatory pain), Disp-60 tablet, R-3, E-Prescribe      oxyCODONE (ROXICODONE) 5 MG tablet Take 1 tablet (5 mg) by mouth every 4 hours as needed for breakthrough pain, Disp-30 tablet, R-0, E-Prescribe      polyethylene glycol (MIRALAX) 17 GM/Dose powder Take 17 g by mouth daily, Disp-510 g, R-0, E-Prescribe       !! - Potential duplicate medications found. Please discuss with provider.        CONTINUE these medications which have NOT CHANGED    Details   azelastine (ASTELIN) 0.1 % nasal spray Spray 1 spray into both nostrils daily as needed for rhinitis, Historical      buPROPion (WELLBUTRIN XL) 300 MG 24 hr tablet Take 300 mg by mouth every  morning, Historical      Cholecalciferol (VITAMIN D3 PO) Take by mouth daily, Historical      losartan-hydrochlorothiazide (HYZAAR) 50-12.5 MG per tablet Take 1 tablet by mouth daily., Historical      multivitamin w/minerals (THERA-VIT-M) tablet Take 1 tablet by mouth daily, Historical      naltrexone (DEPADE/REVIA) 50 MG tablet Take 50 mg by mouth daily, Historical      Omega-3 Fatty Acids (FISH OIL PO) Historical      omeprazole (PRILOSEC) 40 MG DR capsule Take 40 mg by mouth daily, Historical      sertraline (ZOLOFT) 100 MG tablet Take 200 mg by mouth daily, Historical      simvastatin (ZOCOR) 20 MG tablet Take 20 mg by mouth daily, Historical      tamsulosin (FLOMAX) 0.4 MG capsule Take 0.4 mg by mouth daily, Historical      !! aspirin 81 MG EC tablet Take 81 mg by mouth daily, Historical      cetirizine (ZYRTEC) 10 MG tablet Take 10 mg by mouth daily, Historical       !! - Potential duplicate medications found. Please discuss with provider.            Discharge Procedure Orders   Reason for your hospital stay   Order Comments: Left shoulder surgery     Activity   Order Comments: Your activity upon discharge: No weightbearing with left arm. Keep sling on at all times except for hygiene and therapy. Ok for passive elbow and wrist motion. Perform fist pumps while awake. No shoulder range of motion.     Order Specific Question Answer Comments   Is discharge order? Yes      When to contact your care team   Order Comments: Call your primary doctor if you have any of the following:  increased shortness of breath, increased drainage, increased swelling, or increased pain.     Wound care and dressings   Order Comments: Instructions to care for your wound at home: Keep your dressing clean and dry until you are seen back in clinic. Ok to shower with dressing in place. Do not soak incision.     Discharge Instructions   Order Comments: MEDICATIONS   Resume all home medications as directed unless otherwise instructed during  this hospitalization. If there is any question, double check with your primary care provider.  Start new discharge medications as directed.    Take tylenol and ibuprofen as prescribed for mild to moderate pain.     For breakthrough pain use narcotic pain medication as prescribed.    Take two baby aspirin (162mg) per day for 4 weeks for prevention of blood clots. After that, resume taking the baby aspirin as you were prior to surgery.    Do not drive or operate machinery while taking narcotic pain medications.   If you are taking other Tylenol containing medicines at home, be sure NOT to exceed 4 gram's (4000 milligrams) of Tylenol per day.   If you are taking pain medications, be sure to take miralax as well to prevent constipation. If constipated, try adding another cathartic or enema.  If nausea and vomiting, call the hospital or seek medical attention.    ACTIVITY   Weight bearing: no weight bearing or motion of left shoulder. Ok for passive elbow and wrist motion. Fist pumps ok.     DIET  Resume same diet prior to your hospital admission.    WOUND   Leave dressing on until you are seen in clinic for your follow up visit.   Watch for signs and symptoms of infection of your wounds including; pain, redness, swelling, drainage or fever.  If you notice any of these symptoms please call or seek medical attention.    Keep wound clean, dry, and intact.  Do not submerge wounds in water until they are healed. No baths, soaking, swimming, or prolonged water exposure for 4 weeks after surgery.    RETURN   Follow-up with Orthopedic Clinic as directed.     Call the [facility] at [phone] during business hours for any symptoms such as:    * Fevers with Temperature greater than 101.5 degrees.   * Pus drainage from wound site.   * Severe pain, not controlled by medication.   * Persistent nausea, vomiting and inablility to tolerate fluids.    FOR URGENT PROBLEMS ONLY, after hours or on weekends call the  hospital  at [phone] and ask to speak with the orthopedic resident on call.     Follow Up Care   Order Comments: Follow-up with your Surgeon Team in 2 weeks for wound check.     Diet   Order Comments: Regular diet     Order Specific Question Answer Comments   Is discharge order? Yes        Ar Moore MD   Orthopaedic Surgery, PGY5

## 2024-07-18 NOTE — PROGRESS NOTES
[facility]    Medicine Progress Note - Hospitalist Service, GOLD TEAM 19    Date of Admission:  7/17/2024    Assessment & Plan   Prosper Gilmore is a 63-year-old male with a past medical history of hypertension, hyperlipidemia, GERD, depression.  Patient has a history of left shoulder replacement now with recent complication of PJI involving left shoulder s/p explantation and placement of antibiotic spacer followed by I&D.  Operative cultures came back with Cutibacterium acnes and anaerobic sample.  Patient already completed course of antibiotics.  Patient is now s/p explantation of antibiotic spacer, revision to reverse total shoulder arthroplasty.  Medicine consulted for comanagement purposes.    # S/p above procedure given history of PJI involving left shoulder  - ml   -Postop Hgb 13.1  -Already completed preoperative Ancef, also received IV vancomycin.  -Post procedurally, patient has been doing well  -Reports adequate pain control  -Rest of management including pain, DVT prophylaxis, PT OT per primary team    # Depression   -Continue on PTA Wellbutrin   -Continue on PTA Zoloft.      # Dyslipidemia   -Continue on PTA Zocor.      # HTN-currently remains normotensive  -Recommend continue holding PTA HCTZ-losartan     # GERD   -Continue on PTA PPI          Diet: Advance Diet as Tolerated: Regular Diet Adult    DVT Prophylaxis: Defer to primary service  Villar Catheter: Not present  Lines: None     Cardiac Monitoring: None  Code Status: Full Code      Clinically Significant Risk Factors Present on Admission                # Drug Induced Platelet Defect: home medication list includes an antiplatelet medication   # Hypertension: Noted on problem list   # Circulatory Shock: required vasopressors within past 24 hours    # Non-Invasive mechanical ventilation: current O2 Device: BiPAP/CPAP  # Acute hypoxic respiratory failure: continue supplemental O2 as needed           "  # Obesity: Estimated body mass index is 37.11 kg/m  as calculated from the following:    Height as of this encounter: 1.778 m (5' 10\").    Weight as of this encounter: 117.3 kg (258 lb 9.6 oz).              Disposition Plan     Medically Ready for Discharge: Anticipated Today             JD DIAZ MD  Hospitalist Service, GOLD TEAM 19  [facility]  Securely message with [employer] (more info)  Text page via [facility] Paging/Directory   See signed in provider for up to date coverage information  ______________________________________________________________________    Interval History   -Currently remains afebrile and hemodynamically stable  -Patient did require phenylephrine IntraOp, but does remain hemodynamically stable so far.  -Per documentation, patient reports adequate pain control    Physical Exam   Vital Signs: Temp: 97.8  F (36.6  C) Temp src: Oral BP: 123/64 Pulse: 82   Resp: 18 SpO2: 95 % O2 Device: BiPAP/CPAP Oxygen Delivery: 2 LPM  Weight: 258 lbs 9.59 oz    General Appearance: Sitting comfortably in bed, on room air, in no acute distress of discomfort  HEENT: PERRL: EOMI; moist mucous membrane w/o lesions  Neck: No JVD  Pulmonary: Clear to auscultation bilaterally, no wheezes or crackles  CVS: Regular rhythm, no murmurs, rubs or gallops  GI: BS (+), soft nontender, no rebound or guarding   Extremities: Left shoulder in sling.   Skin: No rashes or lesions  Neurologic: A&O x3      Medical Decision Making       35 MINUTES SPENT BY ME on the date of service doing chart review, history, exam, documentation & further activities per the note.      Data   ------------------------- PAST 24 HR DATA REVIEWED -----------------------------------------------    I have personally reviewed the following data over the past 24 hrs:    N/A  \   13.1 (L)   / N/A     N/A N/A N/A /  238 (H)   N/A N/A N/A \       Imaging results reviewed over the past 24 hrs:   Recent Results " (from the past 24 hour(s))   POC US Guidance Needle Placement    Impression    Left interscalene brachial plexus block   XR Shoulder Left Port G/E 2 Views    Narrative    EXAM: XR SHOULDER LEFT PORT G/E 2 VIEWS  LOCATION: [facility]  DATE: 7/17/2024    INDICATION: Status post surgery  COMPARISON: 3/19/2024      Impression    IMPRESSION: Postoperative changes of an explantation of an antibiotic spacer with revision reverse total shoulder arthroplasty. Negative for postoperative purposes.

## 2024-07-18 NOTE — OR NURSING
"PACU to Inpatient Nursing Handoff    Patient Prosper Gilmore is a 63 year old male who speaks English.   Procedure Procedure(s):  Explantation of Shoulder Antibiotic Spacer,  Revision to Reverse Total Shoulder Arthroplasty   Surgeon(s) Primary: Kesha Sewell MD  Resident - Assisting: Ar Moore MD; Mekhi Casanova MD  Fellow - Assisting: Franklin Coates MD     Allergies   Allergen Reactions    Hydrocodone-Acetaminophen      hallucinations    Other (Do Not Use)      Had surgery on 3/5/24 at [facility]. Could not sleep after surgery. Thought possibly medication or anesthesia        Isolation  [unfilled]     Past Medical History   has no past medical history on file.    Anesthesia General with Block   Dermatome Level     Preop Meds TXA 1950mg at 1231. Emend 40mg at 1309   Nerve block Brachial Plexus .  Location:left. Med:Exparel (liposomal bupivacaine). Time given: 1345   Intraop Meds fentanyl (Sublimaze): 100 mcg total  ondansetron (Zofran): last given at 1750   Local Meds No   Antibiotics cefazolin (Ancef) - last given at 1359     Pain Patient Currently in Pain: denies   PACU meds  Not applicable   PCA / epidural No   Capnography     Telemetry ECG Rhythm: Normal sinus rhythm   Inpatient Telemetry Monitor Ordered? No        Labs Glucose Lab Results   Component Value Date     07/17/2024     04/17/2017       Hgb Lab Results   Component Value Date    HGB 15.1 04/17/2017       INR No results found for: \"INR\"   PACU Imaging Completed     Wound/Incision Incision/Surgical Site 07/17/24 Left;Anterior Shoulder (Active)   Incision Assessment UTV 07/17/24 1810   Dressing Hydrofiber 07/17/24 1754   Closure Adhesive strip(s);Approximated;Sutures 07/17/24 1754   Incision Drainage Amount UTV 07/17/24 1810   Incision Care Ice applied 07/17/24 1839   Dressing Intervention Clean, dry, intact 07/17/24 1810   Number of days: 0      CMS        Equipment ice pack and sling   Other LDA       IV Access " Peripheral IV 07/17/24 Right;Posterior Hand (Active)   Site Assessment WDL 07/17/24 1810   Line Status Infusing 07/17/24 1810   Dressing Transparent 07/17/24 1810   Dressing Status clean;dry;intact 07/17/24 1810   Dressing Intervention New dressing  07/17/24 1314   Line Intervention Flushed 07/17/24 1314   Phlebitis Scale 0-->no symptoms 07/17/24 1810   Infiltration? no 07/17/24 1314   Number of days: 0      Blood Products Albumin  mL   Intake/Output Date 07/17/24 0700 - 07/18/24 0659   Shift 9513-6861 1921-3023 2579-3635 24 Hour Total   INTAKE   I.V.  1900  1900   Colloid  250  250   Shift Total(mL/kg)  2150(18.33)  2150(18.33)   OUTPUT   Blood  450  450   Shift Total(mL/kg)  450(3.84)  450(3.84)   Weight (kg) 117.3 117.3 117.3 117.3      Drains / Villar     Time of void PreOp Time of Void Prior to Procedure: 1324 (07/17/24 1339)    PostOp Urine Occurrence: 1 (07/17/24 1324)    Diapered? No   Bladder Scan     PO    water and popsicles     Vitals    B/P: 101/60  T: 99.1  F (37.3  C)    Temp src: Oral  P:  Pulse: 82 (07/17/24 1900)          R: 15  O2:  SpO2: 95 %    O2 Device: Nasal cannula (07/17/24 1830)    Oxygen Delivery: 2 LPM (07/17/24 1830)         Family/support present significant other   Patient belongings     Patient transported on bed and air mat   DC meds/scripts (obs/outpt) Not applicable   Inpatient Pain Meds Released? Yes       Special needs/considerations None   Tasks needing completion None       Demi Christianson RN

## 2024-07-18 NOTE — PROGRESS NOTES
Orthopaedic Surgery Progress Note 07/18/2024    E: No acute events overnight, OR yesterday.     S: Pain well controlled. Numbness at the tip of the thumb that appears to be improving. Had a block for OR  yesterday. Plan of care reviewed and questions answered    O:  Temp: 97.8  F (36.6  C) Temp src: Oral BP: 123/64 Pulse: 82   Resp: 18 SpO2: 95 % O2 Device: BiPAP/CPAP Oxygen Delivery: 2 LPM    Exam:  Gen: No acute distress, resting comfortably in bed.  Resp: Non-labored breathing    MSK:  LUE  Dressing c/d/I  SILT in ax,m,r,u distributions  Fires wrist extensors and flexors, FPL, EPL, IO   2+ radial pulse       Recent Labs   Lab 07/18/24  0652   HGB 13.1*       X-rays: complete    Assessment: Prosper Gilmore is a 63 year old male s/p left shoulder antibiotic spacer explant and revision to reverse TSA on 7/17 with Dr. Sewell. Doing well.      Plan:  Ortho Primary  Activity: Up with assist. Sling in place at all times except hygiene and therapy. Ok for passive elbow and wrist motion. No shoulder ROM.   Weight bearing status:NWB LUE   Antibiotics: Ancef x 24 hours.  Diet: Begin with clear fluids and progress diet as tolerated.  DVT prophylaxis: ASA 162mg and mechanical while in the hospital, discharge on  x 4 weeks.  Bracing/Splinting: slling to be worn at all times except for hygiene and therapy.   Wound Care: keep dressing c/d/I until follow up   Pain management: transition from IV to orals as tolerated.   X-rays: complete  Physical Therapy:  ROM, ADL's.  Occupational Therapy: ADL's.  Labs: Trend Hgb on POD #1  Cultures: Pending, follow culture results closely.  Consults: PT, OT. , appreciate assistance in caring for this patient.  Follow-up: Clinic with Dr. Sewell in 2 weeks    Disposition: Pending progress with therapies, pain control on orals, and medical stability, anticipate discharge to home today    Ar Moore MD 07/18/2024  Orthopaedic Surgery Resident, PGY5

## 2024-07-18 NOTE — CONSULTS
"[facility]  Consult Note - Hospitalist Service, GOLD TEAM   Date of Admission:  7/17/2024  Consult Requested by: Ortho   Reason for Consult: Medically co-management     Assessment & Plan   Prosper Gilmore is a 63 year old male admitted on 7/17/2024. POD # 0 s/p Explantation of Shoulder Antibiotic Spacer, Left - Shoulder. Revision to Reverse Total Shoulder Arthroplasty, Left - Shoulder.  ml.   Internal medicine consulted for medical co-management.   Patient has a past medical history significant for depression, dyslipidemia, GERD and hypertension.    Shoulder surgery  -Ortho is primary.  -Pain control, DVT prophylaxis and anais of antibiotics per orthopedic team.  -PT and OT per protocol.  -Capnography per protocol.  -Gentle hydration.  -Continue monitoring hemoglobin.    Depression   -Continue on PTA Wellbutrin   -Continue on PTA Zoloft.     Dyslipidemia   -Continue on PTA Zocor.     HTN  -Hold PTA losartan-hydrochlorothiazide for now and reassess in the morning.     GERD   -Continue on PTA PPI.        Clinically Significant Risk Factors Present on Admission                # Drug Induced Platelet Defect: home medication list includes an antiplatelet medication   # Hypertension: Noted on problem list             # Obesity: Estimated body mass index is 37.11 kg/m  as calculated from the following:    Height as of this encounter: 1.778 m (5' 10\").    Weight as of this encounter: 117.3 kg (258 lb 9.6 oz).              Dennis Sparks MD  Hospitalist Service, GOLD TEAM   Securely message with [employer] (more info)  Text page via [facility] Paging/Directory   See signed in provider for up to date coverage information  ______________________________________________________________________    Chief Complaint     Shoulder pain    History is obtained from the patient    History of Present Illness   Prosper Gilmore is a 63 year old male admitted on 7/17/2024. POD # 0 s/p " Explantation of Shoulder Antibiotic Spacer, Left - Shoulder. Revision to Reverse Total Shoulder Arthroplasty, Left - Shoulder.  ml.   Internal medicine consulted for medical co-management.   Patient has a past medical history significant for depression, dyslipidemia, GERD and hypertension.  I saw the patient after surgery at the medical floor.  He was alert and oriented.  He looked comfortable watching TV.  Pain was well-controlled.  Patient denies any chest pain, palpitations, shortness of breath, nausea, vomit, abdominal pain, fever or chills.      Past Medical History    No past medical history on file.    Past Surgical History   Past Surgical History:   Procedure Laterality Date    IR LUMBAR PUNCTURE  7/18/2023       Medications   I have reviewed this patient's current medications       Review of Systems    The 10 point Review of Systems is negative other than noted in the HPI or here.      Physical Exam   Vital Signs: Temp: 99.1  F (37.3  C) Temp src: Oral BP: 102/65 Pulse: 78   Resp: 16 SpO2: 94 % O2 Device: None (Room air) Oxygen Delivery: 2 LPM  Weight: 258 lbs 9.59 oz    General Appearance: Alert, on O 2 NC, obese, no acute distress.   Respiratory: Normal respiratory effort, clear lungs.   Cardiovascular: RRR, no murmur.   GI: Obese, soft, + BS, no tenderness to palpation.   Skin: No rash.   Other: Alert, oriented, pleasant, speech clear, no facial droop, moving all extremities.     Medical Decision Making       50 MINUTES SPENT BY ME on the date of service doing chart review, history, exam, documentation & further activities per the note.      Data         Imaging results reviewed over the past 24 hrs:   Recent Results (from the past 24 hour(s))   POC US Guidance Needle Placement    Impression    Left interscalene brachial plexus block   XR Shoulder Left Port G/E 2 Views    Narrative    EXAM: XR SHOULDER LEFT PORT G/E 2 VIEWS  LOCATION: [facility]  DATE:  7/17/2024    INDICATION: Status post surgery  COMPARISON: 3/19/2024      Impression    IMPRESSION: Postoperative changes of an explantation of an antibiotic spacer with revision reverse total shoulder arthroplasty. Negative for postoperative purposes.

## 2024-07-18 NOTE — PLAN OF CARE
Occupational Therapy Discharge Summary    Reason for therapy discharge:    All goals and outcomes met, no further needs identified.    Progress towards therapy goal(s). See goals on Care Plan in [facility] electronic health record for goal details.  Pt. S/I with BADLs and functional mobility, amb. Without AD. Pt. Has all OT equip. Needs met from previous surgeries.    Therapy recommendation(s):    Continue home exercise program.

## 2024-07-18 NOTE — PROGRESS NOTES
"   07/18/24 0901   Appointment Info   Signing Clinician's Name / Credentials (OT) Ashely Dunbar OTR/L   Living Environment   People in Home spouse   Current Living Arrangements house   Transportation Anticipated family or friend will provide;car, drives self   Living Environment Comments pt. lives with his spouse, all needs met on one level, walk in shower with chair   Self-Care   Usual Activity Tolerance good   Current Activity Tolerance moderate   Equipment Currently Used at Home   (has shower chair from prev. surgeries)   Activity/Exercise/Self-Care Comment pt. indep. with BADLs/IADLs at baseline including work(desk job). per pt. spouse can A prn   Instrumental Activities of Daily Living (IADL)   Previous Responsibilities work;driving;finances;medication management;yardwork;shopping;laundry;housekeeping;meal prep   IADL Comments per pt. his spouse can A prn   General Information   Onset of Illness/Injury or Date of Surgery 07/17/24   Referring Physician Kesha Sewell MD   Patient/Family Therapy Goal Statement (OT) to d/c home today   Additional Occupational Profile Info/Pertinent History of Current Problem per chart \" 63 year old male s/p left shoulder antibiotic spacer explant and revision to reverse TSA on 7/17 with Dr. Santa\"   Existing Precautions/Restrictions weight bearing;other (see comments)   Left Upper Extremity (Weight-bearing Status) non weight-bearing (NWB)   General Observations and Info Up with assist. Sling in place at all times except hygiene and therapy. Ok for passive elbow and wrist motion. No shoulder ROM.   Cognitive Status Examination   Orientation Status orientation to person, place and time   Visual Perception   Visual Impairment/Limitations WFL   Pain Assessment   Patient Currently in Pain   (general post op pain)   Range of Motion Comprehensive   Comment, General Range of Motion RUE WFL, LUE limited post op unable to fully assess 2/2 post op prec. elbow, wrist, hand WFL "   Strength Comprehensive (MMT)   Comment, General Manual Muscle Testing (MMT) Assessment RUE WFL, unable to assess LUE 2/2 post op prec.   Bathing Assessment/Intervention   [place] Level (Bathing) minimum assist (75% patient effort)   Comment, (Bathing) per clinical judgement   Upper Body Dressing Assessment/Training   [place] Level (Upper Body Dressing) minimum assist (75% patient effort)   Lower Body Dressing Assessment/Training   [place] Level (Lower Body Dressing) set up   Grooming Assessment/Training   [place] Level (Grooming) set up   Toileting   [place] Level (Toileting) supervision   Clinical Impression   Criteria for Skilled Therapeutic Interventions Met (OT) Yes, treatment indicated   OT Diagnosis impaired ADls   Influenced by the following impairments post op prec, ROM   OT Problem List-Impairments impacting ADL range of motion (ROM);strength;post-surgical precautions;pain   ADL comments/analysis impaired ADls post op   Assessment of Occupational Performance 3-5 Performance Deficits   Identified Performance Deficits dressing, bathing, home mgmt.   Planned Therapy Interventions (OT) ADL retraining;home program guidelines;transfer training;ROM;progressive activity/exercise   Clinical Decision Making Complexity (OT) problem focused assessment/low complexity   Risk & Benefits of therapy have been explained evaluation/treatment results reviewed;care plan/treatment goals reviewed;risks/benefits reviewed;current/potential barriers reviewed;participants voiced agreement with care plan;participants included;patient   OT Total Evaluation Time   OT Eval, Low Complexity Minutes ([id number]) 8   Therapy Certification   Start of Care Date 07/18/24   Certification date from 07/18/24   Certification date to 07/18/24   Medical Diagnosis L reverse TSA revision   OT Goals   Therapy Frequency (OT) One time eval and treatment   OT Predicted Duration/Target Date for Goal Attainment 07/18/24   OT Discharge  Planning   OT Plan OT PLAN: d/c   OT Discharge Recommendation (DC Rec) home with assist   OT Rationale for DC Rec pt. doing well post op and is familiar with prec. and has all OT equip. needs met from previous surgeries. Pt. has good home support.   OT Brief overview of current status pt. SBA/I with BADLs and functional mob. amb. without AD   Total Session Time   Total Session Time (sum of timed and untimed services) 8

## 2024-07-18 NOTE — PLAN OF CARE
"  VS: /63 (BP Location: Left arm, Patient Position: Semi-Garibay's, Cuff Size: Adult Large)   Pulse 71   Temp 98.9  F (37.2  C) (Oral)   Resp 16   Ht 1.778 m (5' 10\")   Wt 117.3 kg (258 lb 9.6 oz)   SpO2 95%   BMI 37.11 kg/m     O2: To room air, sating >92%, denies SOB and chest pain  Pt has CPAP machine from home, uses at HS, provider aware   Output: Voiding adequately without difficulty, using urinals and toilet   Last BM: As per pt 7- before surgery   Activity: Ax1 with gait belt   Skin: Surgical wound on L shoulder   Pain: Denies    CMS: A&Ox4, denies tingling, with numbness on LUE due to nerve block   Dressing: L shoulder CDI, supported with ultrasling    Diet: Regular diet, denies nausea and vomiting   LDA: PIV on R hand with IVF  ml/hr   Equipment: Personal items, call light, CPAP and IV pole   Plan: Pending PT/OT evaluation   Additional Info:        "

## 2024-07-18 NOTE — ANESTHESIA POSTPROCEDURE EVALUATION
Patient: Prosper Gilmore    Procedure: Procedure(s):  Explantation of Shoulder Antibiotic Spacer,  Revision to Reverse Total Shoulder Arthroplasty       Anesthesia Type:  General    Note:  Disposition: Inpatient   Postop Pain Control: Uneventful            Sign Out: Well controlled pain   PONV: No   Neuro/Psych: Uneventful            Sign Out: Acceptable/Baseline neuro status   Airway/Respiratory: Uneventful            Sign Out: Acceptable/Baseline resp. status   CV/Hemodynamics: Uneventful            Sign Out: Acceptable CV status; No obvious hypovolemia; No obvious fluid overload   Other NRE:    DID A NON-ROUTINE EVENT OCCUR? No           Last vitals:  Vitals Value Taken Time   /65 07/17/24 1945   Temp 37.3  C (99.1  F) 07/17/24 1807   Pulse 78 07/17/24 1945   Resp 16 07/17/24 1945   SpO2 93 % 07/17/24 1947   Vitals shown include unfiled device data.    Electronically Signed By: Paul Boyd MD  July 17, 2024  9:07 PM

## 2024-07-18 NOTE — PLAN OF CARE
Goal Outcome Evaluation: POD#1 Explantation of Shoulder Antibiotic Spacer,, Left - Shoulder  Revision to Reverse Total Shoulder Arthroplasty, Left - Shoulder    Plan of Care Reviewed With: patient  Overall Patient Progress: improving    A/Ox4, VS WNL. Ambulating SBA to the bathroom, NWB on the LUE. Denied dizziness, light-headedness, SOB, chestpain. Armsling immobilizer in place. SCD to BLE. CPAP overnight for LANA.Tolerating regular diet. Numbness on the L thumb and not worsening, otherwise, CMS intact. Voiding spontaneously. Last BM before surgery. Pain rated 2/10 and was given with scheduled Tylenol. Coldpack offered but refused.Continue POC. Possible discharge today.

## 2024-07-19 NOTE — OP NOTE
"DATE OF PROCEDURE: 7/17/24    PREOPERATIVE DIAGNOSIS:   History of instability arthropathy and remote history of left total shoulder arthroplasty  Status post explantation of left total shoulder arthroplasty with implant loosening and placement of antibiotic spacer  C acnes infection left shoulder arthroplasty  Left glenoid vault uncontained bone loss    POSTOPERATIVE DIAGNOSIS:   History of instability arthropathy and remove history of left total shoulder arthroplasty  Status post explantation of left total shoulder arthroplasty with implant loosening and placement of antibiotic spacer  C acnes infection left shoulder arthroplasty  Left glenoid vault uncontained bone loss      PROCEDURE:   Explantation of left shoulder antibiotic spacer  Irrigation and excisional debridement of left shoulder  Revision left reverse total shoulder arthroplasty.     NOTE ON COMPLEXITY: Modifier 22 is requested given the increased time necessary to expose the glenohumeral joint, remove the antibiotic spacer, perform necessary releases through arthrofibrosis, and instrument the custom glenoid component. This was completed through a surgical field of multiple previous operations. Additional time of over 50% above standard time was necessary for such a procedure.    STAFF SURGEON: Kesha Sewell MD.   ASSISTANT: Franklin Coates MD; Ar Moore MD; Mekhi Casanova MD    ANESTHESIA: General endotracheal anesthesia.   ESTIMATED BLOOD LOSS: 300 mL.   COMPLICATIONS: None.       IMPLANTS:   Biomet Shoulder custom Vault Reconstructive System glenoid baseplate (with central 6.5 screw and 3 peripheral locking screws)  Biomet Comprehensive Shoulder Glenosphere size 36mm with \"C\" offset inferior  Biomet Comprehensive Shoulder stem 9x83  Biomet Comprehensive Shoulder +3 offset standard tray and 36mm standard bearing    BRIEF PATIENT HISTORY: This patient is known to me from clinic where we have discussed the radiographic and physical exam findings. " The patient's shoulder has been causing lifestyle limiting pain both prior to his explant and since and feels that nonoperative management in the form of retained spacer or operative treatment with hemiarthroplasty would not achieve adequate pain relief or function. Therefore, the patient wished to consider surgical options. We discussed revision reverse total shoulder arthroplasty with a custom glenoid including the risks and benefits and perioperative rehabilitation plan. The patient had the opportunity for questions to be answered and wished to proceed with surgery. Informed consent was completed.     DESCRIPTION OF PROCEDURE: The patient was identified in the preoperative area and the correct left shoulder was marked for surgery. The patient was provided an interscalene block by our anesthesia colleagues. He was taken to the operating room where he was surrendered to general endotracheal anesthesia. He was moved to the operating table in the beachchair position with all bony prominences well padded. The head was placed in a head friedman with the neck in neutral position. The left upper extremity was prepped and draped in the usual sterile fashion. A timeout was held in accordance with hospital policy, confirming correct patient, site, side, procedure and administration of IV antibiotics prior to incision. I completed a deltopectoral incision and approach through the prior scar. This interval was heavily scarred in place and the approach was made more difficult by this scar and the patient's body habitus as well as posterior and far medial erosion of the glenoid. I exposed the proximal humerus in a subperiosteal fashion. Even with these releases the shoulder was very tight and the humeral was very tight. I was ultimately able to safely mobilize this to bring the proximal humerus anterior and lateral to the coracoid. I was then able to remove the spacer which was in fact sewn with nonabsorbable suture to the  proximal humerus bone. All suture material was removed. I then sent cultures from the canal x 2. I used a currette to sharply debride and excise any pseudocapsule.  I then turned my attention to the glenoid. I performed the necessary significant capsular releases. There was profound fibrotic material and cement still filling the glenoid vault. I removed this tissue in an excisional fashion with a osullivan, curettes and the ablator. As per the preop CT and implant plan, the profound glenoid vault bone loss was confirmed. The bone loss was uncontained posteriorly deep in the vault. The tissue was removed over any glenoid bone surface such that the custom baseplate would seat appropriately. I irrigated the wound with 1unit Irricept and then saline with pulse lavage. Once the implant was seated I secured the implant with pins and then the central screw. Three locking screws were then placed.  I prepared the glenoid for the baseplate which was impacted and screws were placed. I then applied the glenosphere. I turned to the humerus and instrumented the humerus with the appropriate size stem (9x83) and this stem seated appropriately at the appropriate height to allow joint reduction. This required removing some greater tuberosity bone. I impacted the stem in the appropriate version. I impacted the final tray and polyethylene liner. I then copiously irrigated the wound. I irrigated the wound then with 15 mL a sterile Betadine and 500 mL of saline. This was then irrigated out with antibiotic saline. One gram Vanco powder was placed deep in the wound.  The wound was then closed in layered fashion with absorbable suture. Steri-Strips and soft sterile dressing were repaired. The arm was placed into an abduction sling and the patient was extubated and transported to recovery in stable condition. There were no apparent intraoperative complications.     POSTOPERATIVE PLAN:   1. The patient will be admitted to the Orthopedic Service  and will begin physical therapy to mobilize out of bed.There will be no shoulder range of motion for 4-6 weeks but the patient can begin immediate hand, wrist and elbow range of motion. We will use aspirin for deep venous thrombosis prophylaxis.   2. The patient will be seen by me in the clinic for wound check at 2 weeks.     CAROLYN ANDERSON MD

## 2024-07-22 LAB
BACTERIA TISS BX CULT: NO GROWTH

## 2024-07-29 ENCOUNTER — OFFICE VISIT (OUTPATIENT)
Dept: ORTHOPEDICS | Facility: CLINIC | Age: 64
End: 2024-07-29
Payer: COMMERCIAL

## 2024-07-29 DIAGNOSIS — Z96.612 STATUS POST REVERSE ARTHROPLASTY OF LEFT SHOULDER: Primary | ICD-10-CM

## 2024-07-29 PROCEDURE — 99024 POSTOP FOLLOW-UP VISIT: CPT | Performed by: ORTHOPAEDIC SURGERY

## 2024-07-29 NOTE — LETTER
7/29/2024      Prosper Gilmore  [address] [facility]  [place] [address]      Dear Colleague,    Thank you for referring your patient, Prosper Gilmore, to the [facility]. Please see a copy of my visit note below.    CHIEF CONCERN: Status post removal of left shoulder antibiotic spacer, I&D, and revision reverse total shoulder arthroplasty (with VRS component)  DATE OF SURGERY: 7/17/24    HISTORY OF PRESENT ILLNESS: Mr. Gilmore is an extremely pleasant 63 year-old man who is 1.5 weeks status post the above procedure. He reports he has not had any pain and is pleased with his shoulder thus far.    EXAM:  Pleasant adult man in NAD  Respirations even and unlabored.  Left upper extremity: Incision clean, dry, and intact. Distally neurovascularly intact without deficits. Shoulder range of motion is not yet initiated    IMAGING: None new    ASSESSMENT:  1. Nearly two weeks status post above procedure    PLAN:  Range of Motion: Will initiate ROM of the shoulder at timing per operative note.   Sling: Continue sling except for bathing/dressing/rehab  Pain medication: NSAIDs and Tylenol PRN  Follow up: in 4 weeks.  Will discontinue sling at that time and initiate AROM. Plan to advance to strengthening at 3 months      Again, thank you for allowing me to participate in the care of your patient.        Sincerely,        Kesha Sewell MD

## 2024-07-29 NOTE — NURSING NOTE
Reason For Visit:   Chief Complaint   Patient presents with    Surgical Followup     1.5 wks S/p explantation of left shoulder antibiotic spacer, irrigation and excisional debridement of left shoulder, revision left reverse total shoulder arthroplasty DOS: 7/17/24 - doing really well!       PCP: No Ref-Primary, Physician  Ref: Dr. Dickinson     ?  No  Occupation .  Currently working? No.  Work status?  Full time.  Date of injury: Worse over time  Type of injury: Worse over time.  Date of surgery: s/p left total shoulder arthroplasty explantation, placement of antibiotic spacer, and irrigation and excisional debridement DOS: 3/6/24; s/p left TSA DOS: 22 years ago; S/p explantation of left shoulder antibiotic spacer, irrigation and excisional debridement of left shoulder, revision left reverse total shoulder arthroplasty DOS: 7/17/24  Smoker: No  Request smoking cessation information: No     Right hand dominant     SANE score  Affected shoulder: Left  Right shoulder SANE: 50   Left shoulder SANE: 0    There were no vitals taken for this visit.    Candi Hodgson, ATC

## 2024-07-31 LAB
BACTERIA TISS BX CULT: NORMAL

## 2024-08-06 NOTE — PROGRESS NOTES
CHIEF CONCERN: Status post removal of left shoulder antibiotic spacer, I&D, and revision reverse total shoulder arthroplasty (with VRS component)  DATE OF SURGERY: 7/17/24    HISTORY OF PRESENT ILLNESS: Mr. Gilmore is an extremely pleasant 63 year-old man who is 1.5 weeks status post the above procedure. He reports he has not had any pain and is pleased with his shoulder thus far.    EXAM:  Pleasant adult man in NAD  Respirations even and unlabored.  Left upper extremity: Incision clean, dry, and intact. Distally neurovascularly intact without deficits. Shoulder range of motion is not yet initiated    IMAGING: None new    ASSESSMENT:  1. Nearly two weeks status post above procedure    PLAN:  Range of Motion: Will initiate ROM of the shoulder at timing per operative note.   Sling: Continue sling except for bathing/dressing/rehab  Pain medication: NSAIDs and Tylenol PRN  Follow up: in 4 weeks.  Will discontinue sling at that time and initiate AROM. Plan to advance to strengthening at 3 months

## 2024-08-19 ENCOUNTER — OFFICE VISIT (OUTPATIENT)
Dept: ORTHOPEDICS | Facility: CLINIC | Age: 64
End: 2024-08-19
Attending: ORTHOPAEDIC SURGERY
Payer: COMMERCIAL

## 2024-08-19 DIAGNOSIS — Z96.612 STATUS POST REVERSE ARTHROPLASTY OF LEFT SHOULDER: Primary | ICD-10-CM

## 2024-08-19 PROCEDURE — 99024 POSTOP FOLLOW-UP VISIT: CPT | Performed by: ORTHOPAEDIC SURGERY

## 2024-08-19 RX ORDER — GABAPENTIN 100 MG/1
100-300 CAPSULE ORAL AT BEDTIME
Qty: 90 CAPSULE | Refills: 0 | Status: SHIPPED | OUTPATIENT
Start: 2024-08-19

## 2024-08-19 NOTE — LETTER
8/19/2024      Prosper Gilmore  [address] [facility]  [place] [address]      Dear Colleague,    Thank you for referring your patient, Prosper Gilmore, to the [facility] [place]. Please see a copy of my visit note below.    CHIEF CONCERN: Status post removal of left shoulder antibiotic spacer, I&D, and revision reverse total shoulder arthroplasty (with VRS component)  DATE OF SURGERY: 7/17/24    HISTORY OF PRESENT ILLNESS: Mr. Gilmore is an extremely pleasant 63 year-old man who is 6 weeks status post the above procedure. He reports that he has no pain and his activity progressing is coming along nicely.    EXAM:  Pleasant adult man in NAD  Respirations even and unlabored.  Left upper extremity: Incision well-healed. Distally neurovascularly intact without deficits. Shoulder range of motion is active forward elevation to 95 and external rotation at the side to 10.    IMAGING: No new    ASSESSMENT:  1.  6 weeks status post above procedure    PLAN:  Range of Motion: Will initiate ROM now  Sling: Discontinue  Follow up: in 6 weeks with new x-rays        Again, thank you for allowing me to participate in the care of your patient.        Sincerely,        Kesha Sewell MD

## 2024-08-19 NOTE — NURSING NOTE
Reason For Visit:   Chief Complaint   Patient presents with    Surgical Followup     Post-op follow up left shoulder DOS 7/17/24  1. Explantation of left shoulder antibiotic spacer  2. Irrigation and excisional debridement of left shoulder  3. Revision left reverse total shoulder arthroplasty.      -been having trouble with sleeping since surgery       PCP: Ysabel Tellez        Right hand dominant    SANE score  Affected shoulder: bilateral  Right shoulder SANE: 50%  Left shoulder SANE: unable to rate, still on restrictions        There were no vitals taken for this visit.      Pain Assessment  Patient Currently in Pain: Augustin Teague, ATC- [facility]

## 2024-08-26 ENCOUNTER — THERAPY VISIT (OUTPATIENT)
Dept: PHYSICAL THERAPY | Facility: CLINIC | Age: 64
End: 2024-08-26
Attending: ORTHOPAEDIC SURGERY
Payer: COMMERCIAL

## 2024-08-26 DIAGNOSIS — Z96.612 STATUS POST REVERSE ARTHROPLASTY OF LEFT SHOULDER: ICD-10-CM

## 2024-08-26 PROCEDURE — 97110 THERAPEUTIC EXERCISES: CPT | Mod: GP

## 2024-08-26 PROCEDURE — 97161 PT EVAL LOW COMPLEX 20 MIN: CPT | Mod: GP

## 2024-08-26 ASSESSMENT — ACTIVITIES OF DAILY LIVING (ADL)
WHEN_LYING_ON_THE_INVOLVED_SIDE: 0
PLEASE_INDICATE_YOR_PRIMARY_REASON_FOR_REFERRAL_TO_THERAPY:: SHOULDER
REACHING_FOR_SOMETHING_ON_A_HIGH_SHELF: 5
AT_ITS_WORST?: 5

## 2024-08-26 NOTE — PROGRESS NOTES
PHYSICAL THERAPY EVALUATION  Type of Visit: Evaluation        Fall Risk Screen:  Fall screen completed by: PT  Have you fallen 2 or more times in the past year?: No  Have you fallen and had an injury in the past year?: No  Is patient a fall risk?: No    Subjective       Presenting condition or subjective complaint: PT for shoulder replacement  Date of onset:      Relevant medical history: High blood pressure; Implanted device; Numbness or tingling in perianal area; Overweight   Dates & types of surgery: 2 shoulder replacements, 1 hip replacement    Prior diagnostic imaging/testing results:       Prior therapy history for the same diagnosis, illness or injury:        Prior Level of Function  Transfers: Independent  Ambulation: Independent  ADL: Independent    Living Environment  Social support: With a significant other or spouse   Type of home: House   Stairs to enter the home: No       Ramp: No   Stairs inside the home: No       Help at home: None  Equipment owned:       Employment: Yes   Hobbies/Interests: none right now, used to do functional weight training    Patient goals for therapy:      Pain assessment:  Pt notes minimal pain, some discomfort and night pain when sleeping.     Objective   Shoulder eval  *indicates pain     Observation: Pt arrives out of sling and is in good spirits    Range of motion -     Left PROM:  Flexion: 110  Abduction: 90  External rotation: deferred  Internal rotation: deferred       Right AROM -   Flexion: WNL  Abduction: WNL  External rotation: WNL  Internal rotation: WNL    Left AROM - deferred       Strength - deferred     Palpation: mild discomfort around scar tissue    Edema/swelling: minimal      Assessment & Plan   CLINICAL IMPRESSIONS  Medical Diagnosis: s/p L reverse TSA    Treatment Diagnosis: s/p L reverse TSA   Impression/Assessment: Patient is a 63 year old who presents to physical therapy s/p left [facility].  The patient presents with typical post op  impaired range of motion and strength which inhibits their ability to perform daily functional tasks, limits work abilities and overall quality of life. The patient tolerated the session well with increase in ROM throughout. The patient will continue to benefit from skilled PT for improved strength, range of motion and mobility allowing him to return to prior level of function and activities he enjoys.       Clinical Decision Making (Complexity):  Clinical Presentation: Stable/Uncomplicated  Clinical Presentation Rationale: based on medical and personal factors listed in PT evaluation  Clinical Decision Making (Complexity): Low complexity    PLAN OF CARE  Treatment Interventions:  Modalities: Cryotherapy  Interventions: Manual Therapy, Neuromuscular Re-education, Therapeutic Activity, Therapeutic Exercise, Self-Care/Home Management    Long Term Goals            Frequency of Treatment:    Duration of Treatment:      Recommended Referrals to Other Professionals:  NA  Education Assessment:        Risks and benefits of evaluation/treatment have been explained.   Patient/Family/caregiver agrees with Plan of Care.     Evaluation Time:           Signing Clinician: Radha Robert, PT

## 2024-08-31 NOTE — PROGRESS NOTES
CHIEF CONCERN: Status post removal of left shoulder antibiotic spacer, I&D, and revision reverse total shoulder arthroplasty (with VRS component)  DATE OF SURGERY: 7/17/24    HISTORY OF PRESENT ILLNESS: Mr. Gilmore is an extremely pleasant 63 year-old man who is 6 weeks status post the above procedure. He reports that he has no pain and his activity progressing is coming along nicely.    EXAM:  Pleasant adult man in NAD  Respirations even and unlabored.  Left upper extremity: Incision well-healed. Distally neurovascularly intact without deficits. Shoulder range of motion is active forward elevation to 95 and external rotation at the side to 10.    IMAGING: No new    ASSESSMENT:  1.  6 weeks status post above procedure    PLAN:  Range of Motion: Will initiate ROM now  Sling: Discontinue  Follow up: in 6 weeks with new x-rays

## 2024-09-05 ENCOUNTER — THERAPY VISIT (OUTPATIENT)
Dept: PHYSICAL THERAPY | Facility: CLINIC | Age: 64
End: 2024-09-05
Payer: COMMERCIAL

## 2024-09-05 DIAGNOSIS — Z96.612 STATUS POST REVERSE ARTHROPLASTY OF LEFT SHOULDER: Primary | ICD-10-CM

## 2024-09-05 PROCEDURE — 97140 MANUAL THERAPY 1/> REGIONS: CPT | Mod: GP

## 2024-09-05 PROCEDURE — 97110 THERAPEUTIC EXERCISES: CPT | Mod: GP

## 2024-09-11 ENCOUNTER — THERAPY VISIT (OUTPATIENT)
Dept: PHYSICAL THERAPY | Facility: CLINIC | Age: 64
End: 2024-09-11
Attending: ORTHOPAEDIC SURGERY
Payer: COMMERCIAL

## 2024-09-11 DIAGNOSIS — Z96.612 S/P REVERSE TOTAL SHOULDER ARTHROPLASTY, LEFT: Primary | ICD-10-CM

## 2024-09-11 PROCEDURE — 97110 THERAPEUTIC EXERCISES: CPT | Mod: GP

## 2024-09-11 PROCEDURE — 97140 MANUAL THERAPY 1/> REGIONS: CPT | Mod: GP

## 2024-09-18 ENCOUNTER — THERAPY VISIT (OUTPATIENT)
Dept: PHYSICAL THERAPY | Facility: CLINIC | Age: 64
End: 2024-09-18
Payer: COMMERCIAL

## 2024-09-18 DIAGNOSIS — Z96.612 S/P REVERSE TOTAL SHOULDER ARTHROPLASTY, LEFT: Primary | ICD-10-CM

## 2024-09-18 PROCEDURE — 97110 THERAPEUTIC EXERCISES: CPT | Mod: GP

## 2024-09-18 PROCEDURE — 97140 MANUAL THERAPY 1/> REGIONS: CPT | Mod: GP

## 2024-09-19 DIAGNOSIS — Z96.612 STATUS POST REVERSE ARTHROPLASTY OF LEFT SHOULDER: Primary | ICD-10-CM

## 2024-09-25 ENCOUNTER — THERAPY VISIT (OUTPATIENT)
Dept: PHYSICAL THERAPY | Facility: CLINIC | Age: 64
End: 2024-09-25
Payer: COMMERCIAL

## 2024-09-25 DIAGNOSIS — Z96.612 S/P REVERSE TOTAL SHOULDER ARTHROPLASTY, LEFT: Primary | ICD-10-CM

## 2024-09-25 PROCEDURE — 97110 THERAPEUTIC EXERCISES: CPT | Mod: GP

## 2024-10-07 ENCOUNTER — ANCILLARY PROCEDURE (OUTPATIENT)
Dept: GENERAL RADIOLOGY | Facility: CLINIC | Age: 64
End: 2024-10-07
Attending: ORTHOPAEDIC SURGERY
Payer: COMMERCIAL

## 2024-10-07 ENCOUNTER — OFFICE VISIT (OUTPATIENT)
Dept: ORTHOPEDICS | Facility: CLINIC | Age: 64
End: 2024-10-07
Payer: COMMERCIAL

## 2024-10-07 DIAGNOSIS — Z96.612 STATUS POST REVERSE ARTHROPLASTY OF LEFT SHOULDER: Primary | ICD-10-CM

## 2024-10-07 DIAGNOSIS — Z96.612 STATUS POST REVERSE ARTHROPLASTY OF LEFT SHOULDER: ICD-10-CM

## 2024-10-07 PROCEDURE — 73030 X-RAY EXAM OF SHOULDER: CPT | Mod: LT | Performed by: STUDENT IN AN ORGANIZED HEALTH CARE EDUCATION/TRAINING PROGRAM

## 2024-10-07 PROCEDURE — 99024 POSTOP FOLLOW-UP VISIT: CPT | Performed by: ORTHOPAEDIC SURGERY

## 2024-10-07 RX ORDER — RAMELTEON 8 MG/1
8 TABLET ORAL
COMMUNITY
Start: 2024-08-28

## 2024-10-07 NOTE — LETTER
10/7/2024      Prosper Gilmore  [address] [facility]  [place] [address]      Dear Colleague,    Thank you for referring your patient, Prosper Gilmore, to the [facility]. Please see a copy of my visit note below.    CHIEF CONCERN: Status post removal of left shoulder antibiotic spacer, I&D, and revision reverse total shoulder arthroplasty (with VRS component)  DATE OF SURGERY: 7/17/24    HISTORY OF PRESENT ILLNESS: Mr. Gilmore is an extremely pleasant 63 year-old man who is three months status post the above procedure.  He has no concerns.  About 2 to 3 weeks ago he had some increased pain with a new physical therapy exercise but that has resolved.    EXAM:  Pleasant adult man in NAD  Respirations even and unlabored.  Left upper extremity: Incision well-healed. Distally neurovascularly intact without deficits. Shoulder range of motion is active forward elevation to 105 and external rotation at the side to 10.  Internal rotation to back pocket    IMAGING:   Left shoulder radiographs were obtained today and reviewed by me.  I agree with rIMPRESSION: Postoperative changes consistent with reverse left total shoulder arthroplasty. Normal alignment without interval hardware complication. Stable appearance compared to prior study. adiologist impression below:      ASSESSMENT:  1.  3 months status post above procedure    PLAN:  We discussed his imaging findings.  We outlined that he can do light resistance bands for strengthening but they should be very low resistance.  At the end of the visit he asked about looking at his right shoulder as he has similar symptoms on that side.  He would not wish to pursue intervention at this immediate point in time.  He is going to return at the 1 year tanner for his left shoulder but he may return at any time for either shoulder.    Again, thank you for allowing me to participate in the care of your patient.        Sincerely,        Kesha Sewell MD

## 2024-10-07 NOTE — NURSING NOTE
Reason For Visit:   Chief Complaint   Patient presents with    RECHECK     3 months S/p explantation of left shoulder antibiotic spacer, irrigation and excisional debridement of left shoulder, revision left reverse total shoulder arthroplasty DOS: 7/17/24 - doing well. No concerns. Wondering what limitations are.        PCP: No Ref-Primary, Physician  Ref: Dr. Dickinson     ?  No  Occupation .  Currently working? Yes.  Work status?  Full time.  Date of injury: Worse over time  Type of injury: Worse over time.  Date of surgery: s/p left total shoulder arthroplasty explantation, placement of antibiotic spacer, and irrigation and excisional debridement DOS: 3/6/24; s/p left TSA DOS: 22 years ago; S/p explantation of left shoulder antibiotic spacer, irrigation and excisional debridement of left shoulder, revision left reverse total shoulder arthroplasty DOS: 7/17/24  Smoker: No  Request smoking cessation information: No     Right hand dominant     SANE score  Affected shoulder: Left  Right shoulder SANE: <50   Left shoulder SANE: 50    There were no vitals taken for this visit.    Candi Hodgson, ATC

## 2024-10-09 ENCOUNTER — THERAPY VISIT (OUTPATIENT)
Dept: PHYSICAL THERAPY | Facility: CLINIC | Age: 64
End: 2024-10-09
Payer: COMMERCIAL

## 2024-10-09 DIAGNOSIS — Z96.612 S/P REVERSE TOTAL SHOULDER ARTHROPLASTY, LEFT: Primary | ICD-10-CM

## 2024-10-09 PROCEDURE — 97110 THERAPEUTIC EXERCISES: CPT | Mod: GP

## 2024-10-17 ENCOUNTER — THERAPY VISIT (OUTPATIENT)
Dept: PHYSICAL THERAPY | Facility: CLINIC | Age: 64
End: 2024-10-17
Payer: COMMERCIAL

## 2024-10-17 DIAGNOSIS — Z96.612 S/P REVERSE TOTAL SHOULDER ARTHROPLASTY, LEFT: Primary | ICD-10-CM

## 2024-10-17 PROCEDURE — 97110 THERAPEUTIC EXERCISES: CPT | Mod: GP

## 2024-10-28 NOTE — PROGRESS NOTES
CHIEF CONCERN: Status post removal of left shoulder antibiotic spacer, I&D, and revision reverse total shoulder arthroplasty (with VRS component)  DATE OF SURGERY: 7/17/24    HISTORY OF PRESENT ILLNESS: Mr. Gilmore is an extremely pleasant 63 year-old man who is three months status post the above procedure.  He has no concerns.  About 2 to 3 weeks ago he had some increased pain with a new physical therapy exercise but that has resolved.    EXAM:  Pleasant adult man in NAD  Respirations even and unlabored.  Left upper extremity: Incision well-healed. Distally neurovascularly intact without deficits. Shoulder range of motion is active forward elevation to 105 and external rotation at the side to 10.  Internal rotation to back pocket    IMAGING:   Left shoulder radiographs were obtained today and reviewed by me.  I agree with rIMPRESSION: Postoperative changes consistent with reverse left total shoulder arthroplasty. Normal alignment without interval hardware complication. Stable appearance compared to prior study. adiologist impression below:      ASSESSMENT:  1.  3 months status post above procedure    PLAN:  We discussed his imaging findings.  We outlined that he can do light resistance bands for strengthening but they should be very low resistance.  At the end of the visit he asked about looking at his right shoulder as he has similar symptoms on that side.  He would not wish to pursue intervention at this immediate point in time.  He is going to return at the 1 year tanner for his left shoulder but he may return at any time for either shoulder.

## 2025-05-11 ENCOUNTER — HEALTH MAINTENANCE LETTER (OUTPATIENT)
Age: 65
End: 2025-05-11

## 2025-07-07 ENCOUNTER — OFFICE VISIT (OUTPATIENT)
Dept: ORTHOPEDICS | Facility: CLINIC | Age: 65
End: 2025-07-07
Attending: ORTHOPAEDIC SURGERY
Payer: COMMERCIAL

## 2025-07-07 DIAGNOSIS — Z96.612 STATUS POST REVERSE ARTHROPLASTY OF LEFT SHOULDER: Primary | ICD-10-CM

## 2025-07-07 PROCEDURE — 99213 OFFICE O/P EST LOW 20 MIN: CPT | Mod: GC | Performed by: ORTHOPAEDIC SURGERY

## 2025-07-07 NOTE — LETTER
2025      Prosper Gilmore  [address] [facility]  [place] [address]      Dear Colleague,    Thank you for referring your patient, Prosper Gilmore, to the [facility]. Please see a copy of my visit note below.    CHIEF CONCERN: Status post left anatomic total shoulder arthroplasty (done around ), removal of left shoulder antibiotic spacer, I&D (3/6/2024), and revision reverse total shoulder arthroplasty with VRS component (2024)    HISTORY OF PRESENT ILLNESS:  Prosper is a pleasant 64-year-old man who is 1 year status post the above procedure, he reports that the only discomfort he has is with extension of the shoulder and occasionally internal/external rotation causing a feeling of pulling or tightness over the anterior shoulder.  He also reports he has the same issue on the right shoulder which recently underwent arthroplasty.  He otherwise has no limitations with his shoulder range of motion.  He is doing light band activities but has not been doing any overhead lifting or pulling.    Of note he did recently undergo a right reverse total shoulder arthroplasty on 3/27/2025 with Dr. HERMAN Dickinson ([facility]) he has been recovering well from this without concerns.    PHYSICAL EXAM:    Pleasant male sitting comfortably in a chair in no apparent distress  Respirations are even unlabored  Focused exam of left upper extremity demonstrates well-healed deltopectoral incision with shoulder range of motion to 145 degrees of forward flexion, 135 degrees of abduction with external rotation, external rotation to approximately 15 degrees, internal rotation to back pocket.  Distally CMS is intact.    IMAGIN view left shoulder x-ray obtained 2025 independently reviewed and compared to prior imaging obtained 10/7/2025 which demonstrates stable humeral stem without evidence of osteolysis or subsidence, glenoid component with well-fixed screws without evidence of haloing or loosening, question of  there there is additional ingrowth/sclerosis seen behind the glenoid baseplate    ASSESSMENT:  1 year status post revision left reverse total shoulder arthroplasty    PLAN:  We reviewed the imaging together and provided reassurance that it appears stable, we did voice her concerns about the bone loss medial to the glenoid baseplate and the concern for loosening thus we reinforced the importance of restrictions with limited lifting particularly with pushing or pulling overhead.  Will plan to have him return in 1 year (approximately 2 years postoperatively) with repeat 4 view x-ray of the left shoulder.    - Continue with postoperative restrictions after revision (no repetitive or heavy pushing or pulling)  - Follow-up in 1 year with repeat 4 view x-rays of the right shoulder    Assessment and plan discussed with Dr. Sewell who is in agreement with the above.    Voice-to-text dictation software was utilized in the creation of this note therefore there may be unintended word substitutions, although errors are generally corrected real-time, there is the potential for a rare error to be present in the completed chart. Please do not hesitate to reach out for clarification.    Kev Conner MD  Orthopaedic Surgery Resident  07/07/25    I have personally examined this patient and have reviewed the clinical presentation and progress note with the resident.  I agree with the treatment plan as outlined.  The plan was formulated with the resident on the day of the resident's note.   Kesha Sewell MD      Again, thank you for allowing me to participate in the care of your patient.        Sincerely,        Kesha Sewell MD    Electronically signed

## 2025-07-07 NOTE — NURSING NOTE
Reason For Visit:   Chief Complaint   Patient presents with    Left Shoulder - Total Joint Post-op     1yr check        PCP: No Ref-Primary, Physician  Ref: Dr. Dickinson    ?  No  Occupation.   Currently working? Yes.  Work status?  Full time.  Type of surgery: s/p left total shoulder arthroplasty explantation, placement of antibiotic spacer, and irrigation and excisional debridement DOS: 3/6/24; s/p left TSA DOS: 22 years ago; S/p explantation of left shoulder antibiotic spacer, irrigation and excisional debridement of left shoulder, revision left reverse total shoulder arthroplasty DOS: 7/17/24 .      Right hand dominant    SANE score  Affected shoulder: left   Right shoulder SANE: 50  Left shoulder SANE: 90    There were no vitals taken for this visit.    Ravinder Goetz, ATC

## 2025-07-07 NOTE — PROGRESS NOTES
CHIEF CONCERN: Status post left anatomic total shoulder arthroplasty (done around ), removal of left shoulder antibiotic spacer, I&D (3/6/2024), and revision reverse total shoulder arthroplasty with VRS component (2024)    HISTORY OF PRESENT ILLNESS:  Prosper is a pleasant 64-year-old man who is 1 year status post the above procedure, he reports that the only discomfort he has is with extension of the shoulder and occasionally internal/external rotation causing a feeling of pulling or tightness over the anterior shoulder.  He also reports he has the same issue on the right shoulder which recently underwent arthroplasty.  He otherwise has no limitations with his shoulder range of motion.  He is doing light band activities but has not been doing any overhead lifting or pulling.    Of note he did recently undergo a right reverse total shoulder arthroplasty on 3/27/2025 with Dr. HERMAN Dickinson ([facility]) he has been recovering well from this without concerns.    PHYSICAL EXAM:    Pleasant male sitting comfortably in a chair in no apparent distress  Respirations are even unlabored  Focused exam of left upper extremity demonstrates well-healed deltopectoral incision with shoulder range of motion to 145 degrees of forward flexion, 135 degrees of abduction with external rotation, external rotation to approximately 15 degrees, internal rotation to back pocket.  Distally CMS is intact.    IMAGIN view left shoulder x-ray obtained 2025 independently reviewed and compared to prior imaging obtained 10/7/2025 which demonstrates stable humeral stem without evidence of osteolysis or subsidence, glenoid component with well-fixed screws without evidence of haloing or loosening, question of there there is additional ingrowth/sclerosis seen behind the glenoid baseplate    ASSESSMENT:  1 year status post revision left reverse total shoulder arthroplasty    PLAN:  We reviewed the imaging together and provided reassurance that it  appears stable, we did voice her concerns about the bone loss medial to the glenoid baseplate and the concern for loosening thus we reinforced the importance of restrictions with limited lifting particularly with pushing or pulling overhead.  Will plan to have him return in 1 year (approximately 2 years postoperatively) with repeat 4 view x-ray of the left shoulder.    - Continue with postoperative restrictions after revision (no repetitive or heavy pushing or pulling)  - Follow-up in 1 year with repeat 4 view x-rays of the right shoulder    Assessment and plan discussed with Dr. Sewell who is in agreement with the above.    Voice-to-text dictation software was utilized in the creation of this note therefore there may be unintended word substitutions, although errors are generally corrected real-time, there is the potential for a rare error to be present in the completed chart. Please do not hesitate to reach out for clarification.    Kev Conner MD  Orthopaedic Surgery Resident  07/07/25    I have personally examined this patient and have reviewed the clinical presentation and progress note with the resident.  I agree with the treatment plan as outlined.  The plan was formulated with the resident on the day of the resident's note.   Kesha Sewell MD

## (undated) DEVICE — IMM KIT SHOULDER STABILIZATION 7210573

## (undated) DEVICE — ESU PENCIL W/SMOKE EVAC NEPTUNE STRYKER 0703-046-000

## (undated) DEVICE — BIT DRILL BIOMET CENTRAL SCR 3.2MM SS 405883

## (undated) DEVICE — ESU CLEANER TIP 31142717

## (undated) DEVICE — SUCTION MANIFOLD NEPTUNE 2 SYS 4 PORT 0702-020-000

## (undated) DEVICE — IMM KIT SHOULDER TMAX MASK FACE 7210559

## (undated) DEVICE — PACK SET-UP STD 9102

## (undated) DEVICE — LINEN ORTHO PACK 5446

## (undated) DEVICE — SU ETHIBOND 2 V-37 4X30" MX69G

## (undated) DEVICE — POSITIONER ARMBOARD FOAM 1PAIR LF FP-ARMB1

## (undated) DEVICE — DRAPE U-DRAPE 1015NSD NON-STERILE

## (undated) DEVICE — PREP CHLORAPREP 26ML TINTED HI-LITE ORANGE 930815

## (undated) DEVICE — DRAPE U-POUCH 34X29" 1067

## (undated) DEVICE — Device

## (undated) DEVICE — SPONGE LAP 18X18" X8435

## (undated) DEVICE — RESTRAINT LIMB HOLDER ANKLE/WRIST FOAM W/QUICK RELEASE 2533

## (undated) DEVICE — SOL HYDROGEN PEROXIDE 3% 4OZ BOTTLE F0010

## (undated) DEVICE — BONE CLEANING TIP INTERPULSE  0210-010-000

## (undated) DEVICE — PREP BRUSH SURG SCRUB CHLOROXYLENOL PCMX 3% 371163

## (undated) DEVICE — SU MONOCRYL 2-0 SH 27" UND Y417H

## (undated) DEVICE — SUCTION IRR SYSTEM W/O TIP INTERPULSE HANDPIECE 0210-100-000

## (undated) DEVICE — DRAPE IOBAN INCISE 23X17" 6650EZ

## (undated) DEVICE — COVER CAMERA IN-LIGHT DISP LT-C02

## (undated) DEVICE — SOL NACL 0.9% IRRIG 1000ML BOTTLE 2F7124

## (undated) DEVICE — DRSG AQUACEL AG HYDROFIBER  3.5X10" 422605

## (undated) DEVICE — ESU GROUND PAD ADULT W/CORD E7507

## (undated) DEVICE — SOL WATER IRRIG 1000ML BOTTLE 2F7114

## (undated) DEVICE — SPECIMEN CONTAINER 5OZ STERILE 2600SA

## (undated) DEVICE — KIT CULTURE TRANSPORT SYS A.C.T. II DUAL ANEROBE R124022

## (undated) DEVICE — DRAPE POUCH INSTRUMENT 1018

## (undated) DEVICE — SYR BULB IRRIG DOVER 60 ML LATEX FREE 67000

## (undated) DEVICE — SOL NACL 0.9% IRRIG 3000ML BAG 2B7477

## (undated) DEVICE — GLOVE BIOGEL PI ORTHOPRO SZ 7.5 47675

## (undated) DEVICE — SU ETHIBOND 0 CT-1 CR 8X18" CX21D

## (undated) DEVICE — SU SILK 2-0 TIE 12X30" A305H

## (undated) DEVICE — GLOVE BIOGEL PI PRO-FIT SZ 7.5 47975

## (undated) DEVICE — CLEANSER JET LAVAGE IRRISEPT 0.05% CHG IRRISEPT45USA

## (undated) DEVICE — SU MONOCRYL 3-0 PS-1 27" Y936H

## (undated) DEVICE — ESU ELEC EDGE INSULATED BLADE 4" E1455-4

## (undated) DEVICE — SPONGE SURGIFOAM 100 1974

## (undated) DEVICE — DRSG STERI STRIP 1/2X4" R1547

## (undated) DEVICE — LINEN TOWEL PACK X5 5464

## (undated) DEVICE — BIT DRILL BIOMET PERIPHERAL SCR 2.7MM SS 405889

## (undated) DEVICE — LINEN DRAPE 54X72" 5467

## (undated) DEVICE — ESU ELEC NDL 1" E1552

## (undated) DEVICE — STRAP KNEE/BODY 31143004

## (undated) RX ORDER — FENTANYL CITRATE 50 UG/ML
INJECTION, SOLUTION INTRAMUSCULAR; INTRAVENOUS
Status: DISPENSED
Start: 2024-07-17

## (undated) RX ORDER — APREPITANT 40 MG/1
CAPSULE ORAL
Status: DISPENSED
Start: 2024-07-17

## (undated) RX ORDER — PROPOFOL 10 MG/ML
INJECTION, EMULSION INTRAVENOUS
Status: DISPENSED
Start: 2024-07-17

## (undated) RX ORDER — CEFAZOLIN SODIUM/WATER 2 G/20 ML
SYRINGE (ML) INTRAVENOUS
Status: DISPENSED
Start: 2024-07-17

## (undated) RX ORDER — EPHEDRINE SULFATE 50 MG/ML
INJECTION, SOLUTION INTRAMUSCULAR; INTRAVENOUS; SUBCUTANEOUS
Status: DISPENSED
Start: 2024-07-17

## (undated) RX ORDER — TRANEXAMIC ACID 650 MG/1
TABLET ORAL
Status: DISPENSED
Start: 2024-07-17

## (undated) RX ORDER — VANCOMYCIN HYDROCHLORIDE 1 G/20ML
INJECTION, POWDER, LYOPHILIZED, FOR SOLUTION INTRAVENOUS
Status: DISPENSED
Start: 2024-07-17